# Patient Record
Sex: MALE | Race: WHITE | NOT HISPANIC OR LATINO | Employment: FULL TIME | URBAN - METROPOLITAN AREA
[De-identification: names, ages, dates, MRNs, and addresses within clinical notes are randomized per-mention and may not be internally consistent; named-entity substitution may affect disease eponyms.]

---

## 2018-01-25 ENCOUNTER — OFFICE VISIT (OUTPATIENT)
Dept: FAMILY MEDICINE | Facility: CLINIC | Age: 28
End: 2018-01-25
Payer: COMMERCIAL

## 2018-01-25 VITALS
SYSTOLIC BLOOD PRESSURE: 135 MMHG | HEART RATE: 73 BPM | TEMPERATURE: 99.2 F | WEIGHT: 179 LBS | HEIGHT: 71 IN | DIASTOLIC BLOOD PRESSURE: 77 MMHG | BODY MASS INDEX: 25.06 KG/M2

## 2018-01-25 DIAGNOSIS — F43.23 ADJUSTMENT DISORDER WITH MIXED ANXIETY AND DEPRESSED MOOD: Primary | ICD-10-CM

## 2018-01-25 PROCEDURE — 99203 OFFICE O/P NEW LOW 30 MIN: CPT | Performed by: FAMILY MEDICINE

## 2018-01-25 RX ORDER — ALUMINUM CHLORIDE 20 %
SOLUTION, NON-ORAL TOPICAL PRN
COMMUNITY
Start: 2017-07-03 | End: 2020-03-25

## 2018-01-25 RX ORDER — DULOXETIN HYDROCHLORIDE 30 MG/1
1 CAPSULE, DELAYED RELEASE ORAL DAILY
COMMUNITY
Start: 2018-01-08 | End: 2018-12-03

## 2018-01-25 RX ORDER — DULOXETIN HYDROCHLORIDE 60 MG/1
60 CAPSULE, DELAYED RELEASE ORAL DAILY
Qty: 30 CAPSULE | Refills: 1 | Status: SHIPPED | OUTPATIENT
Start: 2018-01-25 | End: 2018-12-03

## 2018-01-25 ASSESSMENT — PATIENT HEALTH QUESTIONNAIRE - PHQ9: 5. POOR APPETITE OR OVEREATING: NEARLY EVERY DAY

## 2018-01-25 ASSESSMENT — ANXIETY QUESTIONNAIRES
2. NOT BEING ABLE TO STOP OR CONTROL WORRYING: NEARLY EVERY DAY
5. BEING SO RESTLESS THAT IT IS HARD TO SIT STILL: NOT AT ALL
1. FEELING NERVOUS, ANXIOUS, OR ON EDGE: NEARLY EVERY DAY
IF YOU CHECKED OFF ANY PROBLEMS ON THIS QUESTIONNAIRE, HOW DIFFICULT HAVE THESE PROBLEMS MADE IT FOR YOU TO DO YOUR WORK, TAKE CARE OF THINGS AT HOME, OR GET ALONG WITH OTHER PEOPLE: EXTREMELY DIFFICULT
GAD7 TOTAL SCORE: 16
6. BECOMING EASILY ANNOYED OR IRRITABLE: SEVERAL DAYS
7. FEELING AFRAID AS IF SOMETHING AWFUL MIGHT HAPPEN: NEARLY EVERY DAY
3. WORRYING TOO MUCH ABOUT DIFFERENT THINGS: NEARLY EVERY DAY

## 2018-01-25 ASSESSMENT — PAIN SCALES - GENERAL: PAINLEVEL: NO PAIN (0)

## 2018-01-25 NOTE — PATIENT INSTRUCTIONS
[facility] - [phone]    Take 2 of your Cymbalta 30mg caps until they run out, and then  your new rx for 60mg caps.

## 2018-01-25 NOTE — MR AVS SNAPSHOT
After Visit Summary   1/25/2018    Timothy Thapa    MRN: [id number]           Patient Information     Date Of Birth          1990        Visit Information        Provider Department      1/25/2018 2:40 PM Engelmann, Lauren Anneliese, MD [facility]        Today's Diagnoses     Adjustment disorder with mixed anxiety and depressed mood    -  1      Care Instructions    [facility] - [phone]    Take 2 of your Cymbalta 30mg caps until they run out, and then  your new rx for 60mg caps.           Follow-ups after your visit        Additional Services     MENTAL HEALTH REFERRAL  - Adult; Outpatient Treatment; Individual/Couples/Family/Group Therapy/Health Psychology; [facility]: [facility] [phone]; We will contact you to schedule the appointment or please call with any questions       All scheduling is subject to the client's specific insurance plan & benefits, provider/location availability, and provider clinical specialities.  Please arrive 15 minutes early for your first appointment and bring your completed paperwork.    Please be aware that coverage of these services is subject to the terms and limitations of your health insurance plan.  Call member services at your health plan with any benefit or coverage questions.                            Your next 10 appointments already scheduled     Feb 12, 2018 11:20 AM CST   Office Visit with Lauren Anneliese Engelmann, MD   [facility]    [address] [place] [address] [phone]           Bring a current list of meds and any records pertaining to this visit. For Physicals, please bring immunization records and any forms needing to be filled out. Please arrive 10 minutes early to complete paperwork.              Who to contact     If you have questions or need follow up information about today's  "clinic visit or your schedule please contact [facility] directly at [phone].  Normal or non-critical lab and imaging results will be communicated to you by MyChart, letter or phone within 4 business days after the clinic has received the results. If you do not hear from us within 7 days, please contact the clinic through [employer] or phone. If you have a critical or abnormal lab result, we will notify you by phone as soon as possible.  Submit refill requests through [employer] or call your pharmacy and they will forward the refill request to us. Please allow 3 business days for your refill to be completed.          Additional Information About Your Visit        MyChart Information     [employer] lets you send messages to your doctor, view your test results, renew your prescriptions, schedule appointments and more. To sign up, go to [email or web address] . Click on \"Log in\" on the left side of the screen, which will take you to the Welcome page. Then click on \"Sign up Now\" on the right side of the page.     You will be asked to enter the access code listed below, as well as some personal information. Please follow the directions to create your username and password.     Your access code is: [email or web address]  Expires: 2018  3:40 PM     Your access code will  in 90 days. If you need help or a new code, please call your [place] clinic or [phone].        Care EveryWhere ID     This is your Care EveryWhere ID. This could be used by other organizations to access your [place] medical records  [id number]        Your Vitals Were     Pulse Temperature Height BMI (Body Mass Index)          73 99.2  F (37.3  C) (Oral) 5' 10.75\" (1.797 m) 25.14 kg/m2         Blood Pressure from Last 3 Encounters:   18 135/77    Weight from Last 3 Encounters:   18 179 lb (81.2 kg)              We Performed the Following     MENTAL HEALTH REFERRAL  - Adult; Outpatient Treatment; " Individual/Couples/Family/Group Therapy/Health Psychology; [facility]: [facility] [phone]; We will contact you to schedule the appointment or please call with any questions          Today's Medication Changes          These changes are accurate as of 1/25/18  3:40 PM.  If you have any questions, ask your nurse or doctor.               These medicines have changed or have updated prescriptions.        Dose/Directions    * DULoxetine 30 MG EC capsule   Commonly known as:  CYMBALTA   This may have changed:  Another medication with the same name was added. Make sure you understand how and when to take each.   Changed by:  Engelmann, Lauren Anneliese, MD        Dose:  1 capsule   Take 1 capsule by mouth daily   Refills:  0       * DULoxetine 60 MG EC capsule   Commonly known as:  CYMBALTA   This may have changed:  You were already taking a medication with the same name, and this prescription was added. Make sure you understand how and when to take each.   Used for:  Adjustment disorder with mixed anxiety and depressed mood   Changed by:  Engelmann, Lauren Anneliese, MD        Dose:  60 mg   Take 1 capsule (60 mg) by mouth daily   Quantity:  30 capsule   Refills:  1       * Notice:  This list has 2 medication(s) that are the same as other medications prescribed for you. Read the directions carefully, and ask your doctor or other care provider to review them with you.         Where to get your medicines      These medications were sent to [facility] [address]  [address], [facility] [address]     Phone:  [phone]     DULoxetine 60 MG EC capsule                Primary Care Provider Office Phone # Fax #    Lauren Anneliese Engelmann, [phone]       [facility] [address] [facility] [address]        Equal Access to Services     TYE BURDEN AH: Tarik Nieto  suzan barbour, william cecilmike salazar, altagracia stephenaashonda ah. So [facility] [phone].    ATENCIÓN: Si magda hall, tiene a neal disposición servicios gratuitos de asistencia lingüística. Maday al [phone].    We comply with applicable federal civil rights laws and [place] laws. We do not discriminate on the basis of race, color, national origin, age, disability, sex, sexual orientation, or gender identity.            Thank you!     Thank you for choosing [facility]  for your care. Our goal is always to provide you with excellent care. Hearing back from our patients is one way we can continue to improve our services. Please take a few minutes to complete the written survey that you may receive in the mail after your visit with us. Thank you!             Your Updated Medication List - Protect others around you: Learn how to safely use, store and throw away your medicines at www.disposemymeds.org.          This list is accurate as of 1/25/18  3:40 PM.  Always use your most recent med list.                   Brand Name Dispense Instructions for use Diagnosis    DRYSOL 20 % external solution   Generic drug:  aluminum chloride      as needed        * DULoxetine 30 MG EC capsule    CYMBALTA     Take 1 capsule by mouth daily        * DULoxetine 60 MG EC capsule    CYMBALTA    30 capsule    Take 1 capsule (60 mg) by mouth daily    Adjustment disorder with mixed anxiety and depressed mood       * Notice:  This list has 2 medication(s) that are the same as other medications prescribed for you. Read the directions carefully, and ask your doctor or other care provider to review them with you.

## 2018-01-25 NOTE — PROGRESS NOTES
SUBJECTIVE:   Timothy Thapa is a 27 year old male who presents to clinic today for the following health issues:    Abnormal Mood Symptoms  Onset: 11 years    Description:   Depression: YES  Anxiety: YES    Accompanying Signs & Symptoms:  Still participating in activities that you used to enjoy: YES- finds it difficult  Fatigue: YES  Irritability: no   Difficulty concentrating: no   Changes in appetite: YES  Problems with sleep: YES  Heart racing/beating fast : YES- during anxiety attacks  Thoughts of hurting yourself or others: none    History:   Recent stress: YES  Prior depression hospitalization: yes when teenager  Family history of depression: YES, mom    Family history of anxiety: YES, mom    Precipitating factors:   Alcohol/drug use: YES- occasional alcohol, marjuana    Alleviating factors:  Emotional support animal,breathing exercises    Therapies Tried and outcome: Buspar (Buspirone), Lexapro, Prozac with no help    New patient to the clinic today to discuss anxiety and panic. Feels like Cymbalta works a bit, but wondering if he can go up on the dose.   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 ,  is a great support to him.   No SI, no thoughts of self harm.    Problem list and histories reviewed & adjusted, as indicated.  Additional history: as documented    There is no problem list on file for this patient.    History reviewed. No pertinent surgical history.    Social History   Substance Use Topics     Smoking status: Never Smoker     Smokeless tobacco: Never Used     Alcohol use Yes      Comment: 3 Beers/wk     History reviewed. No pertinent family history.        Reviewed and updated as needed this visit by clinical staff       Reviewed and updated as needed this visit by Provider         ROS:  Constitutional, HEENT, cardiovascular, pulmonary, gi and gu systems are negative, except as otherwise noted.    OBJECTIVE:     /77 (BP Location: Right arm, Patient Position:  "Sitting, Cuff Size: Adult Regular)  Pulse 73  Temp 99.2  F (37.3  C) (Oral)  Ht 5' 10.75\" (1.797 m)  Wt 179 lb (81.2 kg)  BMI 25.14 kg/m2  Body mass index is 25.14 kg/(m^2).  GENERAL: healthy, alert and no distress  NECK: no adenopathy, no asymmetry, masses, or scars and thyroid normal to palpation  RESP: lungs clear to auscultation - no rales, rhonchi or wheezes  CV: regular rate and rhythm, normal S1 S2, no S3 or S4, no murmur, click or rub, no peripheral edema and peripheral pulses strong  ABDOMEN: soft, nontender, no hepatosplenomegaly, no masses and bowel sounds normal  MS: no gross musculoskeletal defects noted, no edema  PSYCH: mentation appears normal, affect normal/bright, judgement and insight intact, appearance well groomed and no SI, no thoughts of self harm, no A/V hallucinations.     Diagnostic Test Results:  none     ASSESSMENT/PLAN:       ICD-10-CM    1. Adjustment disorder with mixed anxiety and depressed mood F43.23 MENTAL HEALTH REFERRAL  - Adult; Outpatient Treatment; Individual/Couples/Family/Group Therapy/Health Psychology; [facility] [phone]; We will contact you to schedule the appointment or please call with any questions     DULoxetine (CYMBALTA) 60 MG EC capsule     Referral to mental health counseling.   Increased Cymbalta. Follow up in 2 weeks.    FUTURE APPOINTMENTS:       - Follow-up visit in 2 weeks       - Follow-up for annual visit or as needed  See Patient Instructions    Lauren A. Engelmann, MD  [facility]  "

## 2018-01-26 ASSESSMENT — ANXIETY QUESTIONNAIRES: GAD7 TOTAL SCORE: 16

## 2018-01-26 ASSESSMENT — PATIENT HEALTH QUESTIONNAIRE - PHQ9: SUM OF ALL RESPONSES TO PHQ QUESTIONS 1-9: 15

## 2018-02-12 ENCOUNTER — OFFICE VISIT (OUTPATIENT)
Dept: FAMILY MEDICINE | Facility: CLINIC | Age: 28
End: 2018-02-12
Payer: COMMERCIAL

## 2018-02-12 VITALS
HEART RATE: 71 BPM | WEIGHT: 177 LBS | TEMPERATURE: 97.9 F | BODY MASS INDEX: 24.86 KG/M2 | OXYGEN SATURATION: 99 % | DIASTOLIC BLOOD PRESSURE: 78 MMHG | SYSTOLIC BLOOD PRESSURE: 141 MMHG

## 2018-02-12 DIAGNOSIS — F33.41 MAJOR DEPRESSIVE DISORDER, RECURRENT EPISODE, IN PARTIAL REMISSION (H): Primary | ICD-10-CM

## 2018-02-12 DIAGNOSIS — F41.1 GAD (GENERALIZED ANXIETY DISORDER): ICD-10-CM

## 2018-02-12 PROCEDURE — 99213 OFFICE O/P EST LOW 20 MIN: CPT | Performed by: FAMILY MEDICINE

## 2018-02-12 ASSESSMENT — PAIN SCALES - GENERAL: PAINLEVEL: NO PAIN (0)

## 2018-02-12 ASSESSMENT — ANXIETY QUESTIONNAIRES
3. WORRYING TOO MUCH ABOUT DIFFERENT THINGS: MORE THAN HALF THE DAYS
5. BEING SO RESTLESS THAT IT IS HARD TO SIT STILL: NOT AT ALL
IF YOU CHECKED OFF ANY PROBLEMS ON THIS QUESTIONNAIRE, HOW DIFFICULT HAVE THESE PROBLEMS MADE IT FOR YOU TO DO YOUR WORK, TAKE CARE OF THINGS AT HOME, OR GET ALONG WITH OTHER PEOPLE: SOMEWHAT DIFFICULT
GAD7 TOTAL SCORE: 9
7. FEELING AFRAID AS IF SOMETHING AWFUL MIGHT HAPPEN: SEVERAL DAYS
1. FEELING NERVOUS, ANXIOUS, OR ON EDGE: MORE THAN HALF THE DAYS
6. BECOMING EASILY ANNOYED OR IRRITABLE: NOT AT ALL
2. NOT BEING ABLE TO STOP OR CONTROL WORRYING: NEARLY EVERY DAY

## 2018-02-12 ASSESSMENT — PATIENT HEALTH QUESTIONNAIRE - PHQ9: 5. POOR APPETITE OR OVEREATING: SEVERAL DAYS

## 2018-02-12 NOTE — PATIENT INSTRUCTIONS
Come back in 4-6 weeks for a physical.   Call for a mental health appointment before I see you again.

## 2018-02-12 NOTE — PROGRESS NOTES
SUBJECTIVE:   Timothy Thapa is a 27 year old male who presents to clinic today for the following health issues:    Depression and Anxiety Follow-Up    Status since last visit: Improved     Other associated symptoms:General anxiety feelings- stated that there is nothing new    Complicating factors:     Significant life event:  got work promotion- has changed structure but has been a positive thing     Current substance abuse: Cannabis    PHQ-9 1/25/2018 2/12/2018   Total Score 15 9   Q9: Suicide Ideation Not at all Not at all     NEETU-7 SCORE 1/25/2018 2/12/2018   Total Score 16 9       PHQ-9  English  PHQ-9   Any Language  NEETU-7  Suicide Assessment Five-step Evaluation and Treatment (SAFE-T)    Amount of exercise or physical activity: None    Problems taking medications regularly: - Sometimes forgetful    Medication side effects: none    Diet: regular (no restrictions)    Says that he's had a lot of good stress lately.  was promoted.  Enjoying things more. Feels like he is able to handle stress better.     Problem list and histories reviewed & adjusted, as indicated.  Additional history: as documented    Patient Active Problem List   Diagnosis     Major depressive disorder, recurrent episode, in partial remission (H)     NEETU (generalized anxiety disorder)     History reviewed. No pertinent surgical history.    Social History   Substance Use Topics     Smoking status: Never Smoker     Smokeless tobacco: Never Used     Alcohol use Yes      Comment: 3 Beers/wk     History reviewed. No pertinent family history.        Reviewed and updated as needed this visit by clinical staff  Tobacco  Allergies  Meds  Med Hx  Surg Hx  Fam Hx  Soc Hx      Reviewed and updated as needed this visit by Provider         ROS:  Constitutional, HEENT, cardiovascular, pulmonary, gi and gu systems are negative, except as otherwise noted.    OBJECTIVE:     /78 (BP Location: Left arm, Patient Position: Chair, Cuff  "Size: Adult Regular)  Pulse 71  Temp 97.9  F (36.6  C) (Oral)  Wt 177 lb (80.3 kg)  SpO2 99%  BMI 24.86 kg/m2  Body mass index is 24.86 kg/(m^2).  GENERAL: healthy, alert and no distress  NECK: no adenopathy, no asymmetry, masses, or scars and thyroid normal to palpation  RESP: lungs clear to auscultation - no rales, rhonchi or wheezes  CV: regular rate and rhythm, normal S1 S2, no S3 or S4, no murmur, click or rub, no peripheral edema and peripheral pulses strong  ABDOMEN: soft, nontender, no hepatosplenomegaly, no masses and bowel sounds normal  MS: no gross musculoskeletal defects noted, no edema  PSYCH: MENTAL STATUS EXAM  Appearance: appropriate, well-groomed   Attitude: cooperative, engaged in conversation   Behavior: normal, no psychomotor agitation or retardation   Eye Contact: normal  Speech: normal ashley, normal volume   Orientation: oriented to person, place, time and situation  Mood: \"improved\"  Affect: tearful, mood-congruent   Thought Process: linear  Suicidal Ideation: No passive or active SI or plan, no thoughts of self-harm, reports children as protective factor   Hallucination: no A or V hallucinations      Diagnostic Test Results:  none     ASSESSMENT/PLAN:       ICD-10-CM    1. Major depressive disorder, recurrent episode, in partial remission (H) F33.41    2. NEETU (generalized anxiety disorder) F41.1      Improving.   Advised patient to schedule psychotherapy visit.   Continue current meds.     FUTURE APPOINTMENTS:       - Follow-up visit in 4-6 weeks       - Follow-up for annual visit or as needed  See Patient Instructions    Lauren A. Engelmann, MD  [facility]    "

## 2018-02-12 NOTE — PROGRESS NOTES
"  SUBJECTIVE:   Timothy Thapa is a 27 year old male who presents to clinic today for the following health issues:    Anxiety Follow-Up    Status since last visit: { :316882::\"No change\"}    Other associated symptoms:{ :014212::\"None\"}    Complicating factors:   Significant life event: { :478881::\"No\"}   Current substance abuse: { :813402::\"None\"}  Depression symptoms: { :002100::\"No\"}  NEETU-7 SCORE 1/25/2018   Total Score 16       NEETU-7    Amount of exercise or physical activity: {Exercise frequency days per week:969985}    Problems taking medications regularly: {Med Problems:144903::\"No\"}    Medication side effects: {CHRONIC MED SIDE EFFECTS:848236::\"none\"}    Diet: { :527105}        {additional problems for provider to add:696744}    Problem list and histories reviewed & adjusted, as indicated.  Additional history: {NONE - AS DOCUMENTED:803121::\"as documented\"}    {HIST REVIEW/ LINKS 2:815076}    Reviewed and updated as needed this visit by clinical staff       Reviewed and updated as needed this visit by Provider         {PROVIDER CHARTING PREFERENCE:334305}    "

## 2018-02-12 NOTE — MR AVS SNAPSHOT
"              After Visit Summary   2/12/2018    Timothy Thapa    MRN: [id number]           Patient Information     Date Of Birth          1990        Visit Information        Provider Department      2/12/2018 11:20 AM Engelmann, Lauren Anneliese, MD [facility]        Care Instructions    Come back in 4-6 weeks for a physical.   Call for a mental health appointment before I see you again.          Follow-ups after your visit        Your next 10 appointments already scheduled     Mar 12, 2018 11:20 AM CDT   SHORT with Lauren Anneliese Engelmann, MD   [facility] ([facility])    [address] [place] [address]   [phone]              Who to contact     If you have questions or need follow up information about today's clinic visit or your schedule please contact [facility] directly at [phone].  Normal or non-critical lab and imaging results will be communicated to you by MyChart, letter or phone within 4 business days after the clinic has received the results. If you do not hear from us within 7 days, please contact the clinic through MyChart or phone. If you have a critical or abnormal lab result, we will notify you by phone as soon as possible.  Submit refill requests through [employer] or call your pharmacy and they will forward the refill request to us. Please allow 3 business days for your refill to be completed.          Additional Information About Your Visit        MyChart Information     [employer] lets you send messages to your doctor, view your test results, renew your prescriptions, schedule appointments and more. To sign up, go to [email or web address] . Click on \"Log in\" on the left side of the screen, which will take you to the Welcome page. Then click on \"Sign up Now\" on the right side of the page.     You will be asked to enter the access code listed below, as well as " some personal information. Please follow the directions to create your username and password.     Your access code is: [email or web address]  Expires: 2018  3:40 PM     Your access code will  in 90 days. If you need help or a new code, please call your [place] clinic or [phone].        Care EveryWhere ID     This is your Care EveryWhere ID. This could be used by other organizations to access your [place] medical records  [id number]        Your Vitals Were     Pulse Temperature Pulse Oximetry BMI (Body Mass Index)          71 97.9  F (36.6  C) (Oral) 99% 24.86 kg/m2         Blood Pressure from Last 3 Encounters:   18 141/78   18 135/77    Weight from Last 3 Encounters:   18 177 lb (80.3 kg)   18 179 lb (81.2 kg)              Today, you had the following     No orders found for display       Primary Care Provider Office Phone # Fax #    Lisandra Anneliese Engelmann, [phone]       [facility] [address] [facility] [address]        Equal Access to Services     NICOLETTE BURDEN : Hadii aad ku hadasho Soomaali, waaxda luqadaha, qaybta kaalmada adeegyada, waxay idiin hayaan adeeg carolinaararachel latatin ah. So [facility] [phone].    ATENCIÓN: Si habla español, tiene a neal disposición servicios gratuitos de asistencia lingüística. Llame al [phone].    We comply with applicable federal civil rights laws and [place] laws. We do not discriminate on the basis of race, color, national origin, age, disability, sex, sexual orientation, or gender identity.            Thank you!     Thank you for choosing [facility]  for your care. Our goal is always to provide you with excellent care. Hearing back from our patients is one way we can continue to improve our services. Please take a few minutes to complete the written survey that you may receive in the mail after your visit with us. Thank you!             Your Updated Medication  List - Protect others around you: Learn how to safely use, store and throw away your medicines at www.disposemymeds.org.          This list is accurate as of 2/12/18 11:51 AM.  Always use your most recent med list.                   Brand Name Dispense Instructions for use Diagnosis    DRYSOL 20 % external solution   Generic drug:  aluminum chloride      as needed        * DULoxetine 30 MG EC capsule    CYMBALTA     Take 1 capsule by mouth daily        * DULoxetine 60 MG EC capsule    CYMBALTA    30 capsule    Take 1 capsule (60 mg) by mouth daily    Adjustment disorder with mixed anxiety and depressed mood       * Notice:  This list has 2 medication(s) that are the same as other medications prescribed for you. Read the directions carefully, and ask your doctor or other care provider to review them with you.

## 2018-02-12 NOTE — NURSING NOTE
"Chief Complaint   Patient presents with     Depression     Anxiety       Initial /78 (BP Location: Left arm, Patient Position: Chair, Cuff Size: Adult Regular)  Pulse 71  Temp 97.9  F (36.6  C) (Oral)  Wt 177 lb (80.3 kg)  SpO2 99%  BMI 24.86 kg/m2 Estimated body mass index is 24.86 kg/(m^2) as calculated from the following:    Height as of 1/25/18: 5' 10.75\" (1.797 m).    Weight as of this encounter: 177 lb (80.3 kg).  Medication Reconciliation: complete  Ora Johnson MA    "

## 2018-02-13 ASSESSMENT — PATIENT HEALTH QUESTIONNAIRE - PHQ9: SUM OF ALL RESPONSES TO PHQ QUESTIONS 1-9: 9

## 2018-02-13 ASSESSMENT — ANXIETY QUESTIONNAIRES: GAD7 TOTAL SCORE: 9

## 2018-02-27 PROBLEM — F41.1 GAD (GENERALIZED ANXIETY DISORDER): Status: ACTIVE | Noted: 2018-02-27

## 2018-02-27 PROBLEM — F33.41 MAJOR DEPRESSIVE DISORDER, RECURRENT EPISODE, IN PARTIAL REMISSION (H): Status: ACTIVE | Noted: 2018-02-27

## 2018-11-27 ENCOUNTER — TELEPHONE (OUTPATIENT)
Dept: FAMILY MEDICINE | Facility: CLINIC | Age: 28
End: 2018-11-27

## 2018-11-27 NOTE — TELEPHONE ENCOUNTER
Patient scheduled an appointment with PCP on 12/03 via [employer] for the following:    Exposure to Giardia and looking to restart anxiety medications.     Routing to review symptoms prior to appointment.

## 2018-11-27 NOTE — TELEPHONE ENCOUNTER
Called patient at [phone] (home). Left message on voicemail to return phone call to triage line at [phone].  Beth Sharp RN [facility] Triage.

## 2018-11-28 NOTE — TELEPHONE ENCOUNTER
Called patient at [phone] (home) Left message on voicemail to return phone call to triage line at [phone].  Beth Sharp RN [facility] Triage.

## 2018-11-30 NOTE — TELEPHONE ENCOUNTER
Called patient..  Left message on voicemail to return phone call to triage line at [phone].  Beth Sharp RN [facility] Triage.

## 2018-12-03 ENCOUNTER — OFFICE VISIT (OUTPATIENT)
Dept: FAMILY MEDICINE | Facility: CLINIC | Age: 28
End: 2018-12-03
Payer: COMMERCIAL

## 2018-12-03 VITALS
SYSTOLIC BLOOD PRESSURE: 126 MMHG | WEIGHT: 188 LBS | BODY MASS INDEX: 26.41 KG/M2 | TEMPERATURE: 98 F | DIASTOLIC BLOOD PRESSURE: 80 MMHG | HEART RATE: 71 BPM | OXYGEN SATURATION: 97 %

## 2018-12-03 DIAGNOSIS — F33.41 MAJOR DEPRESSIVE DISORDER, RECURRENT EPISODE, IN PARTIAL REMISSION (H): Primary | ICD-10-CM

## 2018-12-03 DIAGNOSIS — F41.1 GAD (GENERALIZED ANXIETY DISORDER): ICD-10-CM

## 2018-12-03 DIAGNOSIS — R11.0 NAUSEA: ICD-10-CM

## 2018-12-03 DIAGNOSIS — F40.232 PHOBIA OF DENTAL PROCEDURE: ICD-10-CM

## 2018-12-03 PROCEDURE — 99214 OFFICE O/P EST MOD 30 MIN: CPT | Performed by: FAMILY MEDICINE

## 2018-12-03 RX ORDER — LORAZEPAM 1 MG/1
0.5-1 TABLET ORAL EVERY 8 HOURS PRN
Qty: 8 TABLET | Refills: 0 | Status: SHIPPED | OUTPATIENT
Start: 2018-12-03 | End: 2020-06-30

## 2018-12-03 RX ORDER — DULOXETIN HYDROCHLORIDE 30 MG/1
CAPSULE, DELAYED RELEASE ORAL
Qty: 50 CAPSULE | Refills: 0 | Status: SHIPPED | OUTPATIENT
Start: 2018-12-03 | End: 2019-01-07

## 2018-12-03 RX ORDER — ONDANSETRON 4 MG/1
4 TABLET, ORALLY DISINTEGRATING ORAL EVERY 6 HOURS PRN
Qty: 30 TABLET | Refills: 1 | Status: SHIPPED | OUTPATIENT
Start: 2018-12-03 | End: 2020-06-30

## 2018-12-03 ASSESSMENT — PATIENT HEALTH QUESTIONNAIRE - PHQ9
5. POOR APPETITE OR OVEREATING: MORE THAN HALF THE DAYS
SUM OF ALL RESPONSES TO PHQ QUESTIONS 1-9: 11

## 2018-12-03 ASSESSMENT — PAIN SCALES - GENERAL: PAINLEVEL: NO PAIN (0)

## 2018-12-03 ASSESSMENT — ANXIETY QUESTIONNAIRES
1. FEELING NERVOUS, ANXIOUS, OR ON EDGE: NEARLY EVERY DAY
5. BEING SO RESTLESS THAT IT IS HARD TO SIT STILL: SEVERAL DAYS
GAD7 TOTAL SCORE: 16
6. BECOMING EASILY ANNOYED OR IRRITABLE: SEVERAL DAYS
3. WORRYING TOO MUCH ABOUT DIFFERENT THINGS: NEARLY EVERY DAY
IF YOU CHECKED OFF ANY PROBLEMS ON THIS QUESTIONNAIRE, HOW DIFFICULT HAVE THESE PROBLEMS MADE IT FOR YOU TO DO YOUR WORK, TAKE CARE OF THINGS AT HOME, OR GET ALONG WITH OTHER PEOPLE: EXTREMELY DIFFICULT
7. FEELING AFRAID AS IF SOMETHING AWFUL MIGHT HAPPEN: NEARLY EVERY DAY
2. NOT BEING ABLE TO STOP OR CONTROL WORRYING: NEARLY EVERY DAY

## 2018-12-03 NOTE — PROGRESS NOTES
SUBJECTIVE:   Timothy Thapa is a 28 year old male who presents to clinic today for the following health issues:    Depression and Anxiety Follow-Up    Status since last visit: No change    Other associated symptoms:Frequent anxiety and panic attacks, general depression    Complicating factors: stopped medication     Significant life event: yes, upcoming job change     Current substance abuse: None    PHQ 1/25/2018 2/12/2018 12/3/2018   PHQ-9 Total Score 15 9 11   Q9: Suicide Ideation Not at all Not at all Not at all     NEETU-7 SCORE 1/25/2018 2/12/2018 12/3/2018   Total Score 16 9 16     In the past two weeks have you had thoughts of suicide or self-harm?  No.    Do you have concerns about your personal safety or the safety of others?   No  PHQ-9  English  PHQ-9   Any Language  NEETU-7  Suicide Assessment Five-step Evaluation and Treatment (SAFE-T)     Amount of exercise or physical activity: None     Problems taking medications regularly: No    Medication side effects: Sour stomach when taking medications, consistency with meds    Diet: regular (no restrictions)    Also has upcoming dental work, gets very anxious, wonders if he can have anything to help him stay calm     Amenable to restarting Cymbalta, got slightly nauseated when he first started it - requesting Zofran. He isn't really sure why he stopped in the first place. Hasn't taken it in months.     Work is stressful but getting better. Ownership of his place of employment is changing, this is good.     Partner remains an excellent support system.     Problem list and histories reviewed & adjusted, as indicated.  Additional history: as documented    Patient Active Problem List   Diagnosis     Major depressive disorder, recurrent episode, in partial remission (H)     NEETU (generalized anxiety disorder)     History reviewed. No pertinent surgical history.    Social History     Tobacco Use     Smoking status: Never Smoker     Smokeless tobacco: Never Used    Substance Use Topics     Alcohol use: Yes     Comment: 3 Beers/wk     History reviewed. No pertinent family history.        Reviewed and updated as needed this visit by clinical staff  Tobacco  Allergies  Meds  Med Hx  Surg Hx  Fam Hx  Soc Hx      Reviewed and updated as needed this visit by Provider         ROS:  Constitutional, HEENT, cardiovascular, pulmonary, gi and gu systems are negative, except as otherwise noted.    OBJECTIVE:     /80 (BP Location: Left arm, Patient Position: Chair, Cuff Size: Adult Regular)   Pulse 71   Temp 98  F (36.7  C) (Oral)   Wt 85.3 kg (188 lb)   SpO2 97%   BMI 26.41 kg/m    Body mass index is 26.41 kg/m .  GENERAL: healthy, alert, no distress and over weight  RESP: lungs clear to auscultation - no rales, rhonchi or wheezes  CV: regular rate and rhythm, normal S1 S2, no S3 or S4, no murmur, click or rub, no peripheral edema and peripheral pulses strong  ABDOMEN: soft, nontender, no hepatosplenomegaly, no masses and bowel sounds normal  PSYCH: mentation appears normal, affect normal/bright, judgement and insight intact and appearance well groomed    Diagnostic Test Results:  none     ASSESSMENT/PLAN:       ICD-10-CM    1. Major depressive disorder, recurrent episode, in partial remission (H) F33.41 DULoxetine (CYMBALTA) 30 MG capsule   2. NEETU (generalized anxiety disorder) F41.1 DULoxetine (CYMBALTA) 30 MG capsule   3. Phobia of dental procedure F40.232 LORazepam (ATIVAN) 1 MG tablet   4. Nausea R11.0 ondansetron (ZOFRAN ODT) 4 MG ODT tab     Restart cymbalta, discussed importance of taking daily.   Ok for lorazepam for dental procedures.   ED and crisis resources reviewed    zofran PRN for nausea associated with cymbalta initiation.     Patient Safety Assessment:    After gathering the above information, Timothy presents the following high risk factors for suicide: male.  Timothy denies current fears or concerns for personal safety.    Timothy has the following  Protective Factors: Sense of responsibility to family, Life Satisfaction, Positive coping skills and Positive social support     Upon review of the patient interview, identification of the above factors, safety strategy alternatives, and treatment plan interventions: medication start     Regular exercise  See Patient Instructions    Lauren A. Engelmann, MD  [facility]

## 2018-12-03 NOTE — MR AVS SNAPSHOT
After Visit Summary   12/3/2018    Timothy Thapa    MRN: [id number]           Patient Information     Date Of Birth          1990        Visit Information        Provider Department      12/3/2018 11:00 AM Engelmann, Lauren Anneliese, MD [facility]        Today's Diagnoses     Major depressive disorder, recurrent episode, in partial remission (H)    -  1    NEETU (generalized anxiety disorder)        Phobia of dental procedure        Nausea           Follow-ups after your visit        Follow-up notes from your care team     Return in about 4 weeks (around 12/31/2018) for Mood Followup.      Your next 10 appointments already scheduled     Jan 07, 2019 11:00 AM CST   SHORT with Lauren Anneliese Engelmann, MD   [facility] ([facility])    [address] [place] [id number] [phone]              Who to contact     If you have questions or need follow up information about today's clinic visit or your schedule please contact [facility] directly at [phone].  Normal or non-critical lab and imaging results will be communicated to you by MyChart, letter or phone within 4 business days after the clinic has received the results. If you do not hear from us within 7 days, please contact the clinic through MyChart or phone. If you have a critical or abnormal lab result, we will notify you by phone as soon as possible.  Submit refill requests through [employer] or call your pharmacy and they will forward the refill request to us. Please allow 3 business days for your refill to be completed.          Additional Information About Your Visit        Care EveryWhere ID     This is your Care EveryWhere ID. This could be used by other organizations to access your [facility] medical records  [id number]        Your Vitals Were     Pulse Temperature Pulse Oximetry BMI (Body Mass Index)          71 98   F (36.7  C) (Oral) 97% 26.41 kg/m2         Blood Pressure from Last 3 Encounters:   12/03/18 126/80   02/12/18 141/78   01/25/18 135/77    Weight from Last 3 Encounters:   12/03/18 188 lb (85.3 kg)   02/12/18 177 lb (80.3 kg)   01/25/18 179 lb (81.2 kg)              Today, you had the following     No orders found for display         Today's Medication Changes          These changes are accurate as of 12/3/18 11:40 AM.  If you have any questions, ask your nurse or doctor.               Start taking these medicines.        Dose/Directions    LORazepam 1 MG tablet   Commonly known as:  ATIVAN   Used for:  Phobia of dental procedure   Started by:  Engelmann, Lauren Anneliese, MD        Dose:  0.5-1 mg   Take 0.5-1 tablets (0.5-1 mg) by mouth every 8 hours as needed for anxiety Take 30 minutes prior to dental work.  Do not operate a vehicle after taking this medication   Quantity:  8 tablet   Refills:  0       ondansetron 4 MG ODT tab   Commonly known as:  ZOFRAN ODT   Used for:  Nausea   Started by:  Engelmann, Lauren Anneliese, MD        Dose:  4 mg   Take 1 tablet (4 mg) by mouth every 6 hours as needed for nausea   Quantity:  30 tablet   Refills:  1         These medicines have changed or have updated prescriptions.        Dose/Directions    DULoxetine 30 MG capsule   Commonly known as:  CYMBALTA   This may have changed:    - how much to take  - how to take this  - when to take this  - additional instructions   Used for:  Major depressive disorder, recurrent episode, in partial remission (H), NEETU (generalized anxiety disorder)   Changed by:  Engelmann, Lauren Anneliese, MD        For 10 days, take 30mg per day, then increase to 60mg daily.   Quantity:  50 capsule   Refills:  0            Where to get your medicines      These medications were sent to [facility] - [place], [place] - [address]  [address], [facility] [address]     Phone:  [phone]      DULoxetine 30 MG capsule    ondansetron 4 MG ODT tab         Some of these will need a paper prescription and others can be bought over the counter.  Ask your nurse if you have questions.     Bring a paper prescription for each of these medications     LORazepam 1 MG tablet                Primary Care Provider Office Phone # Fax #    Lisandra Anneliese Engelmann, [phone]       [facility] [address] [facility] [address]        Equal Access to Services     TYE BURDEN : Hadii aad ku hadasho Soomaali, waaxda luqadaha, qaybta kaalmada adeegyada, waxay idiin hayaan adeeg kharash la'tosin . [facility] [phone].    ATENCIÓN: Si habla español, tiene a neal disposición servicios gratuitos de asistencia lingüística. [facility] [phone].    We comply with applicable federal civil rights laws and [place] laws. We do not discriminate on the basis of race, color, national origin, age, disability, sex, sexual orientation, or gender identity.            Thank you!     Thank you for choosing [facility]  for your care. Our goal is always to provide you with excellent care. Hearing back from our patients is one way we can continue to improve our services. Please take a few minutes to complete the written survey that you may receive in the mail after your visit with us. Thank you!             Your Updated Medication List - Protect others around you: Learn how to safely use, store and throw away your medicines at www.disposemymeds.org.          This list is accurate as of 12/3/18 11:40 AM.  Always use your most recent med list.                   Brand Name Dispense Instructions for use Diagnosis    DRYSOL 20 % external solution   Generic drug:  aluminum chloride      as needed        DULoxetine 30 MG capsule    CYMBALTA    50 capsule    For 10 days, take 30mg per day, then increase to 60mg daily.    Major depressive disorder, recurrent episode, in partial  remission (H), NEETU (generalized anxiety disorder)       LORazepam 1 MG tablet    ATIVAN    8 tablet    Take 0.5-1 tablets (0.5-1 mg) by mouth every 8 hours as needed for anxiety Take 30 minutes prior to dental work.  Do not operate a vehicle after taking this medication    Phobia of dental procedure       ondansetron 4 MG ODT tab    ZOFRAN ODT    30 tablet    Take 1 tablet (4 mg) by mouth every 6 hours as needed for nausea    Nausea

## 2018-12-04 ASSESSMENT — ANXIETY QUESTIONNAIRES: GAD7 TOTAL SCORE: 16

## 2019-01-07 ENCOUNTER — OFFICE VISIT (OUTPATIENT)
Dept: FAMILY MEDICINE | Facility: CLINIC | Age: 29
End: 2019-01-07
Payer: COMMERCIAL

## 2019-01-07 DIAGNOSIS — F41.1 GAD (GENERALIZED ANXIETY DISORDER): Primary | ICD-10-CM

## 2019-01-07 PROCEDURE — 99441 ZZC PHYSICIAN TELEPHONE EVALUATION 5-10 MIN: CPT | Performed by: FAMILY MEDICINE

## 2019-01-07 RX ORDER — DULOXETIN HYDROCHLORIDE 60 MG/1
60 CAPSULE, DELAYED RELEASE ORAL DAILY
Qty: 90 CAPSULE | Refills: 1 | Status: SHIPPED | OUTPATIENT
Start: 2019-01-07 | End: 2019-08-23

## 2019-01-07 ASSESSMENT — ANXIETY QUESTIONNAIRES
IF YOU CHECKED OFF ANY PROBLEMS ON THIS QUESTIONNAIRE, HOW DIFFICULT HAVE THESE PROBLEMS MADE IT FOR YOU TO DO YOUR WORK, TAKE CARE OF THINGS AT HOME, OR GET ALONG WITH OTHER PEOPLE: SOMEWHAT DIFFICULT
5. BEING SO RESTLESS THAT IT IS HARD TO SIT STILL: SEVERAL DAYS
6. BECOMING EASILY ANNOYED OR IRRITABLE: NOT AT ALL
1. FEELING NERVOUS, ANXIOUS, OR ON EDGE: NEARLY EVERY DAY
3. WORRYING TOO MUCH ABOUT DIFFERENT THINGS: MORE THAN HALF THE DAYS
2. NOT BEING ABLE TO STOP OR CONTROL WORRYING: MORE THAN HALF THE DAYS
GAD7 TOTAL SCORE: 10
7. FEELING AFRAID AS IF SOMETHING AWFUL MIGHT HAPPEN: SEVERAL DAYS

## 2019-01-07 ASSESSMENT — PATIENT HEALTH QUESTIONNAIRE - PHQ9
SUM OF ALL RESPONSES TO PHQ QUESTIONS 1-9: 10
5. POOR APPETITE OR OVEREATING: SEVERAL DAYS

## 2019-01-07 NOTE — PROGRESS NOTES
"Timothy Thapa is a 28 year old male who is being evaluated via a telephone visit.      The patient has been notified of following:     \"This telephone visit will be conducted via a call between you and your physician/provider. We have found that certain health care needs can be provided without the need for a physical exam.  This service lets us provide the care you need with a short phone conversation.  If a prescription is necessary we can send it directly to your pharmacy.  If lab work is needed we can place an order for that and you can then stop by our lab to have the test done at a later time.    We will bill your insurance company for this service.  Please check with your medical insurance if this type of visit is covered. You may be responsible for the cost of this type of visit if insurance coverage is denied.  The typical cost is $30 (10min), $59(11-20min) and $85 (21-30min).  Most often these visits are shorter than 10 minutes.    If during the course of the call the physician/provider feels a telephone visit is not appropriate, you will not be charged for this service. \"     Consent has been obtained for this service by 1 care team member:yes. See the scanned image in the medical record.    Preferred patient phone number to be used for this call: [phone]     Timothy Thapa complains of  Anxiety and Depression follow up.    No past medical history on file.   Social History     Socioeconomic History     Marital status:      Spouse name: Not on file     Number of children: Not on file     Years of education: Not on file     Highest education level: Not on file   Social Needs     Financial resource strain: Not on file     Food insecurity - worry: Not on file     Food insecurity - inability: Not on file     Transportation needs - medical: Not on file     Transportation needs - non-medical: Not on file   Occupational History     Not on file   Tobacco Use     Smoking status: Never Smoker     " Smokeless tobacco: Never Used   Substance and Sexual Activity     Alcohol use: Yes     Comment: 3 Beers/wk     Drug use: Yes     Types: Marijuana     Sexual activity: Yes     Partners: Male   Other Topics Concern     Parent/sibling w/ CABG, MI or angioplasty before 65F 55M? Not Asked   Social History Narrative     Not on file     ALLERGIES  Amoxicillin and Penicillins    Additional provider notes: Patient doing very well. He is taking meds every day. Mild nausea but that has resolved. Feels like general day to day anxiety is better, still having some situational anxiety but this is manageable. Ok with maintaining on 60mg Cymbalta per day.     Assessment/Plan:  Timothy was seen today for telephone encounter dictation.    Diagnoses and all orders for this visit:    NEETU (generalized anxiety disorder)  -     DULoxetine (CYMBALTA) 60 MG capsule; Take 1 capsule (60 mg) by mouth daily          Total time spent on this phone visit with the patient = 6 minutes     I have reviewed the note as documented above.  This accurately captures the substance of my conversation with the patient.     Lauren Engelmann, MD

## 2019-01-09 ASSESSMENT — ANXIETY QUESTIONNAIRES: GAD7 TOTAL SCORE: 10

## 2019-08-23 ENCOUNTER — MYC REFILL (OUTPATIENT)
Dept: FAMILY MEDICINE | Facility: CLINIC | Age: 29
End: 2019-08-23

## 2019-08-23 DIAGNOSIS — F41.1 GAD (GENERALIZED ANXIETY DISORDER): ICD-10-CM

## 2019-08-26 DIAGNOSIS — F41.1 GAD (GENERALIZED ANXIETY DISORDER): ICD-10-CM

## 2019-08-26 RX ORDER — DULOXETIN HYDROCHLORIDE 60 MG/1
60 CAPSULE, DELAYED RELEASE ORAL DAILY
Qty: 90 CAPSULE | Refills: 1 | Status: SHIPPED | OUTPATIENT
Start: 2019-08-26 | End: 2020-04-02

## 2019-08-26 NOTE — TELEPHONE ENCOUNTER
"Requested Prescriptions   Pending Prescriptions Disp Refills     DULoxetine (CYMBALTA) 60 MG capsule [Pharmacy Med Name: DULOXETINE HCL 60MG CPEP] 90 capsule 1     Sig: TAKE ONE CAPSULE BY MOUTH ONCE DAILY   Last Written Prescription Date:  1-7-19  Last Fill Quantity: 90,  # refills: 1   Last office visit: 1/7/2019 with prescribing provider:  1-7-19   Future Office Visit:   Next 5 appointments (look out 90 days)    Sep 09, 2019  8:00 AM CDT  PHYSICAL with SABRINA Guevara CNP  [facility] [address] [place] [id number]  [phone]             Serotonin-Norepinephrine Reuptake Inhibitors  Passed - 8/26/2019 11:20 AM        Passed - Blood pressure under 140/90 in past 12 months     BP Readings from Last 3 Encounters:   12/03/18 126/80   02/12/18 141/78   01/25/18 135/77                 Passed - Recent (12 mo) or future (30 days) visit within the authorizing provider's specialty     Patient had office visit in the last 12 months or has a visit in the next 30 days with authorizing provider or within the authorizing provider's specialty.  See \"Patient Info\" tab in inbasket, or \"Choose Columns\" in Meds & Orders section of the refill encounter.              Passed - Medication is active on med list        Passed - Patient is age 18 or older          "

## 2019-08-26 NOTE — TELEPHONE ENCOUNTER
"Requested Prescriptions   Pending Prescriptions Disp Refills     DULoxetine (CYMBALTA) 60 MG capsule 90 capsule 1     Sig: Take 1 capsule (60 mg) by mouth daily       Serotonin-Norepinephrine Reuptake Inhibitors  Passed - 8/23/2019  8:30 AM        Passed - Blood pressure under 140/90 in past 12 months     BP Readings from Last 3 Encounters:   12/03/18 126/80   02/12/18 141/78   01/25/18 135/77                 Passed - Recent (12 mo) or future (30 days) visit within the authorizing provider's specialty     Patient had office visit in the last 12 months or has a visit in the next 30 days with authorizing provider or within the authorizing provider's specialty.  See \"Patient Info\" tab in inbasket, or \"Choose Columns\" in Meds & Orders section of the refill encounter.              Passed - Medication is active on med list        Passed - Patient is age 18 or older        Last refill 1/7/19 # 90 with one refill.  Last OV 1/7/19    Routing refill request to provider for review/approval because:  Medication is still under Dr. Mulligan.  Beth Sharp, RN [facility] Triage.              "

## 2019-08-27 RX ORDER — DULOXETIN HYDROCHLORIDE 60 MG/1
CAPSULE, DELAYED RELEASE ORAL
Qty: 90 CAPSULE | Refills: 1 | Status: SHIPPED | OUTPATIENT
Start: 2019-08-27 | End: 2020-06-30

## 2019-08-27 NOTE — TELEPHONE ENCOUNTER
Prescription approved per [facility] Refill Protocol.  Santy Bhatt RN

## 2020-03-11 ENCOUNTER — HEALTH MAINTENANCE LETTER (OUTPATIENT)
Age: 30
End: 2020-03-11

## 2020-03-25 ENCOUNTER — MYC MEDICAL ADVICE (OUTPATIENT)
Dept: FAMILY MEDICINE | Facility: CLINIC | Age: 30
End: 2020-03-25

## 2020-03-25 DIAGNOSIS — R61 HYPERHIDROSIS: Primary | ICD-10-CM

## 2020-03-25 RX ORDER — ALUMINUM CHLORIDE 20 %
SOLUTION, NON-ORAL TOPICAL
Qty: 35 ML | Refills: 0 | Status: SHIPPED | OUTPATIENT
Start: 2020-03-25 | End: 2020-06-30

## 2020-03-25 NOTE — TELEPHONE ENCOUNTER
Please see [employer] message.     Patient last seen by Dr. Mulligan - need telephone visit?     Routed to PCP to review and advise.     Radha Lopez, RN, BSN, PHN  [facility]: [place]

## 2020-04-02 ENCOUNTER — MYC REFILL (OUTPATIENT)
Dept: FAMILY MEDICINE | Facility: CLINIC | Age: 30
End: 2020-04-02

## 2020-04-02 DIAGNOSIS — F41.1 GAD (GENERALIZED ANXIETY DISORDER): ICD-10-CM

## 2020-04-02 RX ORDER — DULOXETIN HYDROCHLORIDE 60 MG/1
60 CAPSULE, DELAYED RELEASE ORAL DAILY
Qty: 90 CAPSULE | Refills: 1 | Status: SHIPPED | OUTPATIENT
Start: 2020-04-02 | End: 2020-10-26

## 2020-06-24 ENCOUNTER — MYC MEDICAL ADVICE (OUTPATIENT)
Dept: FAMILY MEDICINE | Facility: CLINIC | Age: 30
End: 2020-06-24

## 2020-06-30 ENCOUNTER — VIRTUAL VISIT (OUTPATIENT)
Dept: FAMILY MEDICINE | Facility: CLINIC | Age: 30
End: 2020-06-30
Payer: COMMERCIAL

## 2020-06-30 DIAGNOSIS — F41.1 GENERALIZED ANXIETY DISORDER: Primary | ICD-10-CM

## 2020-06-30 PROCEDURE — 99213 OFFICE O/P EST LOW 20 MIN: CPT | Mod: TEL | Performed by: INTERNAL MEDICINE

## 2020-06-30 RX ORDER — BUSPIRONE HYDROCHLORIDE 10 MG/1
10 TABLET ORAL 3 TIMES DAILY PRN
Qty: 90 TABLET | Refills: 3 | Status: SHIPPED | OUTPATIENT
Start: 2020-06-30 | End: 2020-10-26

## 2020-06-30 ASSESSMENT — ANXIETY QUESTIONNAIRES
5. BEING SO RESTLESS THAT IT IS HARD TO SIT STILL: NOT AT ALL
1. FEELING NERVOUS, ANXIOUS, OR ON EDGE: NEARLY EVERY DAY
3. WORRYING TOO MUCH ABOUT DIFFERENT THINGS: NEARLY EVERY DAY
IF YOU CHECKED OFF ANY PROBLEMS ON THIS QUESTIONNAIRE, HOW DIFFICULT HAVE THESE PROBLEMS MADE IT FOR YOU TO DO YOUR WORK, TAKE CARE OF THINGS AT HOME, OR GET ALONG WITH OTHER PEOPLE: VERY DIFFICULT
7. FEELING AFRAID AS IF SOMETHING AWFUL MIGHT HAPPEN: MORE THAN HALF THE DAYS
6. BECOMING EASILY ANNOYED OR IRRITABLE: SEVERAL DAYS
GAD7 TOTAL SCORE: 15
2. NOT BEING ABLE TO STOP OR CONTROL WORRYING: NEARLY EVERY DAY

## 2020-06-30 ASSESSMENT — PATIENT HEALTH QUESTIONNAIRE - PHQ9
SUM OF ALL RESPONSES TO PHQ QUESTIONS 1-9: 8
5. POOR APPETITE OR OVEREATING: NEARLY EVERY DAY

## 2020-06-30 NOTE — PROGRESS NOTES
"Timothy Thapa is a 30 year old male who is being evaluated via a billable telephone visit.      The patient has been notified of following:     \"This telephone visit will be conducted via a call between you and your physician/provider. We have found that certain health care needs can be provided without the need for a physical exam.  This service lets us provide the care you need with a short phone conversation.  If a prescription is necessary we can send it directly to your pharmacy.  If lab work is needed we can place an order for that and you can then stop by our lab to have the test done at a later time.    Telephone visits are billed at different rates depending on your insurance coverage. During this emergency period, for some insurers they may be billed the same as an in-person visit.  Please reach out to your insurance provider with any questions.    If during the course of the call the physician/provider feels a telephone visit is not appropriate, you will not be charged for this service.\"    Patient has given verbal consent for Telephone visit?  Yes    What phone number would you like to be contacted at? [phone]    How would you like to obtain your AVS? Blake Gant     Timothy Thapa is a 30 year old male who presents via phone visit today for the following health issues:    HPI  Depression and Anxiety Follow-Up    How are you doing with your depression since your last visit? Worsened change in life plan    How are you doing with your anxiety since your last visit?  Worsened change in life plan    Are you having other symptoms that might be associated with depression or anxiety? No    Have you had a significant life event? OTHER: not going to college, but working with people and causing some more anxiety     Do you have any concerns with your use of alcohol or other drugs? No    Social History     Tobacco Use     Smoking status: Never Smoker     Smokeless tobacco: Never Used   Substance " Use Topics     Alcohol use: Yes     Comment: 3 Beers/wk     Drug use: Yes     Types: Marijuana     PHQ 2/12/2018 12/3/2018 1/7/2019   PHQ-9 Total Score 9 11 10   Q9: Thoughts of better off dead/self-harm past 2 weeks Not at all Not at all Not at all     NEETU-7 SCORE 2/12/2018 12/3/2018 1/7/2019   Total Score 9 16 10     Last PHQ-9 6/30/2020   1.  Little interest or pleasure in doing things 1   2.  Feeling down, depressed, or hopeless 2   3.  Trouble falling or staying asleep, or sleeping too much 1   4.  Feeling tired or having little energy 1   5.  Poor appetite or overeating 0   6.  Feeling bad about yourself 3   7.  Trouble concentrating 0   8.  Moving slowly or restless 0   Q9: Thoughts of better off dead/self-harm past 2 weeks 0   PHQ-9 Total Score 8   Difficulty at work, home, or with people Very difficult     NEETU-7  6/30/2020   1. Feeling nervous, anxious, or on edge 3   2. Not being able to stop or control worrying 3   3. Worrying too much about different things 3   4. Trouble relaxing 3   5. Being so restless that it is hard to sit still 0   6. Becoming easily annoyed or irritable 1   7. Feeling afraid, as if something awful might happen 2   NEETU-7 Total Score 15   If you checked any problems, how difficult have they made it for you to do your work, take care of things at home, or get along with other people? Very difficult     In the past two weeks have you had thoughts of suicide or self-harm?  No.    Do you have concerns about your personal safety or the safety of others?   No    Suicide Assessment Five-step Evaluation and Treatment (SAFE-T)      How many servings of fruits and vegetables do you eat daily?  2-3    On average, how many sweetened beverages do you drink each day (Examples: soda, juice, sweet tea, etc.  Do NOT count diet or artificially sweetened beverages)?   0    How many days per week do you exercise enough to make your heart beat faster? 3 or less    How many minutes a day do you exercise  enough to make your heart beat faster?     How many days per week do you miss taking your medication? 0     side effect low libido      Patient Active Problem List   Diagnosis     Major depressive disorder, recurrent episode, in partial remission (H)     NEETU (generalized anxiety disorder)     No past surgical history on file.    Social History     Tobacco Use     Smoking status: Never Smoker     Smokeless tobacco: Never Used   Substance Use Topics     Alcohol use: Yes     Comment: 3 Beers/wk     No family history on file.      Current Outpatient Medications   Medication Sig Dispense Refill     busPIRone (BUSPAR) 10 MG tablet Take 1 tablet (10 mg) by mouth 3 times daily as needed (anxiety) 90 tablet 3     DULoxetine (CYMBALTA) 60 MG capsule Take 1 capsule (60 mg) by mouth daily 90 capsule 1     Allergies   Allergen Reactions     Amoxicillin Hives     Penicillins      No lab results found.   BP Readings from Last 3 Encounters:   12/03/18 126/80   02/12/18 141/78   01/25/18 135/77    Wt Readings from Last 3 Encounters:   12/03/18 85.3 kg (188 lb)   02/12/18 80.3 kg (177 lb)   01/25/18 81.2 kg (179 lb)                    Reviewed and updated as needed this visit by Provider         Review of Systems   Constitutional, HEENT, cardiovascular, pulmonary, gi and gu systems are negative, except as otherwise noted.       Objective   Reported vitals:  There were no vitals taken for this visit.   healthy, alert and no distress  PSYCH: Alert and oriented times 3; coherent speech, normal   rate and volume, able to articulate logical thoughts, able   to abstract reason, no tangential thoughts, no hallucinations   or delusions  His affect is normal  RESP: No cough, no audible wheezing, able to talk in full sentences  Remainder of exam unable to be completed due to telephone visits    Diagnostic Test Results:  Labs reviewed in Epic  No results found for any visits on 06/30/20.        Assessment/Plan:  1. Generalized anxiety  disorder    - busPIRone (BUSPAR) 10 MG tablet; Take 1 tablet (10 mg) by mouth 3 times daily as needed (anxiety)  Dispense: 90 tablet; Refill: 3      Continue cymbalta at 60 mg dose   Counseling   Exercise   Healthy diet   Sleep of 8 -9 hours per day    No follow-ups on file.      Phone call duration:  23 minutes    Hugo Davis MD

## 2020-07-01 ASSESSMENT — ANXIETY QUESTIONNAIRES: GAD7 TOTAL SCORE: 15

## 2020-10-26 ENCOUNTER — MYC REFILL (OUTPATIENT)
Dept: FAMILY MEDICINE | Facility: CLINIC | Age: 30
End: 2020-10-26

## 2020-10-26 DIAGNOSIS — F41.1 GENERALIZED ANXIETY DISORDER: ICD-10-CM

## 2020-10-26 DIAGNOSIS — F41.1 GAD (GENERALIZED ANXIETY DISORDER): ICD-10-CM

## 2020-10-26 RX ORDER — DULOXETIN HYDROCHLORIDE 60 MG/1
60 CAPSULE, DELAYED RELEASE ORAL DAILY
Qty: 90 CAPSULE | Refills: 1 | Status: SHIPPED | OUTPATIENT
Start: 2020-10-26 | End: 2021-03-10 | Stop reason: ALTCHOICE

## 2020-10-26 NOTE — TELEPHONE ENCOUNTER
"Requested Prescriptions   Pending Prescriptions Disp Refills    DULoxetine (CYMBALTA) 60 MG capsule 90 capsule 1     Sig: Take 1 capsule (60 mg) by mouth daily       Serotonin-Norepinephrine Reuptake Inhibitors  Failed - 10/26/2020 10:32 AM        Failed - Blood pressure under 140/90 in past 12 months     BP Readings from Last 3 Encounters:   12/03/18 126/80   02/12/18 141/78   01/25/18 135/77                 Passed - Recent (12 mo) or future (30 days) visit within the authorizing provider's specialty     Patient has had an office visit with the authorizing provider or a provider within the authorizing providers department within the previous 12 mos or has a future within next 30 days. See \"Patient Info\" tab in inbasket, or \"Choose Columns\" in Meds & Orders section of the refill encounter.              Passed - Medication is active on med list        Passed - Patient is age 18 or older           Routing refill request to provider for review/approval because:  Failed protocol.  Beth COOPERN-RN  [facility]    "

## 2020-10-29 RX ORDER — BUSPIRONE HYDROCHLORIDE 10 MG/1
10 TABLET ORAL 3 TIMES DAILY PRN
Qty: 90 TABLET | Refills: 0 | Status: SHIPPED | OUTPATIENT
Start: 2020-10-29 | End: 2021-01-06

## 2020-10-29 NOTE — TELEPHONE ENCOUNTER
"Requested Prescriptions   Pending Prescriptions Disp Refills     busPIRone (BUSPAR) 10 MG tablet 90 tablet 3     Sig: Take 1 tablet (10 mg) by mouth 3 times daily as needed (anxiety)       Atypical Antidepressants Protocol Passed - 10/26/2020 10:32 AM        Passed - Recent (12 mo) or future (30 days) visit within the authorizing provider's specialty     Patient has had an office visit with the authorizing provider or a provider within the authorizing providers department within the previous 12 mos or has a future within next 30 days. See \"Patient Info\" tab in inbasket, or \"Choose Columns\" in Meds & Orders section of the refill encounter.              Passed - Medication active on med list        Passed - Patient is age 18 or older           Prescription approved per [facility] Refill Protocol.  ALLISON GloverN-RN  [facility]    "

## 2021-01-03 ENCOUNTER — HEALTH MAINTENANCE LETTER (OUTPATIENT)
Age: 31
End: 2021-01-03

## 2021-01-06 ENCOUNTER — VIRTUAL VISIT (OUTPATIENT)
Dept: FAMILY MEDICINE | Facility: CLINIC | Age: 31
End: 2021-01-06
Payer: COMMERCIAL

## 2021-01-06 DIAGNOSIS — F33.41 MAJOR DEPRESSIVE DISORDER, RECURRENT EPISODE, IN PARTIAL REMISSION (H): Primary | ICD-10-CM

## 2021-01-06 PROCEDURE — 99214 OFFICE O/P EST MOD 30 MIN: CPT | Mod: TEL | Performed by: INTERNAL MEDICINE

## 2021-01-06 PROCEDURE — 96127 BRIEF EMOTIONAL/BEHAV ASSMT: CPT | Performed by: INTERNAL MEDICINE

## 2021-01-06 RX ORDER — ESCITALOPRAM OXALATE 20 MG/1
10-20 TABLET ORAL DAILY
Qty: 90 TABLET | Refills: 3 | Status: SHIPPED | OUTPATIENT
Start: 2021-01-06 | End: 2022-02-09

## 2021-01-06 ASSESSMENT — PATIENT HEALTH QUESTIONNAIRE - PHQ9: SUM OF ALL RESPONSES TO PHQ QUESTIONS 1-9: 16

## 2021-01-06 NOTE — PATIENT INSTRUCTIONS
Week 1:  Cymbalta 60 mg every other day for 7 days  Week 2:  Cymbalta, 1/2 lexapro, nothing, Cymbalta, 1/2 lexparo, nothing 6 days  Week 3:  1/2 lexapro every day   Week 4: 1/2  lexapro every days  Week 5 : 1 full lexapro daily

## 2021-01-06 NOTE — PROGRESS NOTES
Timothy Thapa is a 30 year old male who is being evaluated via a billable telephone visit.      What phone number would you like to be contacted at? [phone]  How would you like to obtain your AVS? MyChart  Assessment & Plan   Problem List Items Addressed This Visit     Major depressive disorder, recurrent episode, in partial remission (H) - Primary    Relevant Medications    escitalopram (LEXAPRO) 20 MG tablet         Reviewed safety plan and suicide risk    I've explained to him that drugs of the SSRI class can have side effects such as weight gain, sexual dysfunction, insomnia, headache, nausea. These medications are generally effective at alleviating symptoms of anxiety and/or depression. Let me know if significant side effects do occur.                             Depression Screening Follow Up    PHQ 1/6/2021   PHQ-9 Total Score 16   Q9: Thoughts of better off dead/self-harm past 2 weeks More than half the days     Last PHQ-9 1/6/2021   1.  Little interest or pleasure in doing things 3   2.  Feeling down, depressed, or hopeless 3   3.  Trouble falling or staying asleep, or sleeping too much 2   4.  Feeling tired or having little energy 2   5.  Poor appetite or overeating 1   6.  Feeling bad about yourself 3   7.  Trouble concentrating 0   8.  Moving slowly or restless 0   Q9: Thoughts of better off dead/self-harm past 2 weeks 2   PHQ-9 Total Score 16   Difficulty at work, home, or with people Extremely dIfficult         No flowsheet data found.      Follow Up      Follow Up Actions Taken  Crisis resource information provided in the After Visit Summary  Mental Health Referral placed    Discussed the following ways the patient can remain in a safe environment:  be around others    Work on weight loss  Regular exercise    Return in about 2 months (around 3/6/2021).    Hugo Davis MD  [facility]    Subjective     Timothy Thapa is a 30 year old who presents to clinic today for  the following health issues     HPI     Depression Followup    How are you doing with your depression since your last visit? No change    Are you having other symptoms that might be associated with depression? No    Have you had a significant life event?  No     Are you feeling anxious or having panic attacks?   No    Do you have any concerns with your use of alcohol or other drugs? No    Social History     Tobacco Use     Smoking status: Never Smoker     Smokeless tobacco: Never Used   Substance Use Topics     Alcohol use: Yes     Comment: 3 Beers/wk     Drug use: Yes     Types: Marijuana     PHQ 1/7/2019 6/30/2020 1/6/2021   PHQ-9 Total Score 10 8 16   Q9: Thoughts of better off dead/self-harm past 2 weeks Not at all Not at all More than half the days     NEETU-7 SCORE 12/3/2018 1/7/2019 6/30/2020   Total Score 16 10 15     Last PHQ-9 1/6/2021   1.  Little interest or pleasure in doing things 3   2.  Feeling down, depressed, or hopeless 3   3.  Trouble falling or staying asleep, or sleeping too much 2   4.  Feeling tired or having little energy 2   5.  Poor appetite or overeating 1   6.  Feeling bad about yourself 3   7.  Trouble concentrating 0   8.  Moving slowly or restless 0   Q9: Thoughts of better off dead/self-harm past 2 weeks 2   PHQ-9 Total Score 16   Difficulty at work, home, or with people Extremely dIfficult       buspar helpful at first and then the comfort level with school and medication evened out a bit and hard to remember     Taking the cymbalta   Hard to determine at the moment when they developed into the current   Suicidal thoughts having during this time   mother used to place in the hospital if having a fight   In the past and feeling young 18 - 23 medications ( bounced ) zoloft     Messing with stomach and nausea   Anti-nausea medication while starting - to help   Irrational set of fears.  Instead of consistent feeling   Obsessive thought in the back of the mind  Therapist .  Early  Decemember  Before having a break downandneeds to talk to people   Some tanked the sex drive    In the past two weeks have you had thoughts of suicide or self-harm?  Yes  In the past two weeks have you thought of a plan or intent to harm yourself? No.  Do you have concerns about your personal safety or the safety of others?   Yes     Suicide Assessment Five-step Evaluation and Treatment (SAFE-T)      How many servings of fruits and vegetables do you eat daily?  0-1    On average, how many sweetened beverages do you drink each day (Examples: soda, juice, sweet tea, etc.  Do NOT count diet or artificially sweetened beverages)?   0    How many days per week do you exercise enough to make your heart beat faster? 3 or less    How many minutes a day do you exercise enough to make your heart beat faster? 30 - 60    How many days per week do you miss taking your medication? 0        Review of Systems   Constitutional, HEENT, cardiovascular, pulmonary, gi and gu systems are negative, except as otherwise noted.      Objective           Vitals:  No vitals were obtained today due to virtual visit.    Physical Exam   healthy, alert and no distress  PSYCH: Alert and oriented times 3; coherent speech, normal   rate and volume, able to articulate logical thoughts, able   to abstract reason, no tangential thoughts, no hallucinations   or delusions  His affect is normal  RESP: No cough, no audible wheezing, able to talk in full sentences  Remainder of exam unable to be completed due to telephone visits    No results found for any visits on 01/06/21.            Phone call duration: 20 minutes

## 2021-03-10 ENCOUNTER — VIRTUAL VISIT (OUTPATIENT)
Dept: FAMILY MEDICINE | Facility: CLINIC | Age: 31
End: 2021-03-10
Payer: COMMERCIAL

## 2021-03-10 DIAGNOSIS — K08.89 PAIN, DENTAL: Primary | ICD-10-CM

## 2021-03-10 PROCEDURE — 99213 OFFICE O/P EST LOW 20 MIN: CPT | Mod: 95 | Performed by: PHYSICIAN ASSISTANT

## 2021-03-10 NOTE — PROGRESS NOTES
Ibeth is a 30 year old who is being evaluated via a billable video visit.      How would you like to obtain your AVS? MyChart  If the video visit is dropped, the invitation should be resent by: Text to cell phone: [phone]   Will anyone else be joining your video visit? No      Video Start Time: 2:22 PM    Assessment & Plan     Pain, dental  Discussed risk of using narcotics.   not concerning, ok to use short term.  encouraged to use as little as possible.  Keep dental appointment.  If he develops a fever or drainage will need to be seen.    - acetaminophen-codeine (TYLENOL #3) 300-30 MG tablet; Take 1 tablet by mouth every 6 hours as needed for severe pain                 No follow-ups on file.    Michelle Berger PA-C  [facility]    Alfreda Cristina is a 30 year old who presents for the following health issues     HPI       Patient is requesting medication for dental pain.Taking Ibuprofen and tylenol -no relief. Dental appointment scheduled for 3-25-21   Has a dental phobia.  Had not been to dentist for a long time.  Just restarted going again in the last 2 years.  Needs two teeth removed.  Was to get these a while ago but due to the pandemic didn't go.  Also grinds teeth.  Has pain on left side.  Pain started in the middle of the night last night and constant.  Doesn't look infected, maybe a little swelling.  No fever.    No hx of CD.          Review of Systems   As above      Objective           Vitals:  No vitals were obtained today due to virtual visit.    Physical Exam   GENERAL: Healthy, alert and no distress  EYES: Eyes grossly normal to inspection.  No discharge or erythema, or obvious scleral/conjunctival abnormalities.  RESP: No audible wheeze, cough, or visible cyanosis.  No visible retractions or increased work of breathing.    SKIN: Visible skin clear. No significant rash, abnormal pigmentation or lesions.  NEURO: Cranial nerves grossly intact.  Mentation and  speech appropriate for age.  PSYCH: Mentation appears normal, affect normal/bright, judgement and insight intact, normal speech and appearance well-groomed.                Video-Visit Details    Type of service:  Video Visit    Video End Time:2:32 PM    Originating Location (pt. Location): Home    Distant Location (provider location):  [facility]     Platform used for Video Visit: [employer]

## 2021-04-25 ENCOUNTER — HEALTH MAINTENANCE LETTER (OUTPATIENT)
Age: 31
End: 2021-04-25

## 2021-09-18 ENCOUNTER — ANCILLARY PROCEDURE (OUTPATIENT)
Dept: GENERAL RADIOLOGY | Facility: CLINIC | Age: 31
End: 2021-09-18
Attending: FAMILY MEDICINE
Payer: COMMERCIAL

## 2021-09-18 ENCOUNTER — OFFICE VISIT (OUTPATIENT)
Dept: URGENT CARE | Facility: URGENT CARE | Age: 31
End: 2021-09-18
Payer: COMMERCIAL

## 2021-09-18 VITALS
SYSTOLIC BLOOD PRESSURE: 120 MMHG | DIASTOLIC BLOOD PRESSURE: 73 MMHG | OXYGEN SATURATION: 95 % | TEMPERATURE: 98.9 F | HEART RATE: 73 BPM

## 2021-09-18 DIAGNOSIS — S99.911A INJURY OF RIGHT ANKLE, INITIAL ENCOUNTER: ICD-10-CM

## 2021-09-18 DIAGNOSIS — S82.891A CLOSED FRACTURE OF RIGHT ANKLE, INITIAL ENCOUNTER: Primary | ICD-10-CM

## 2021-09-18 PROCEDURE — 73610 X-RAY EXAM OF ANKLE: CPT | Mod: RT | Performed by: RADIOLOGY

## 2021-09-18 PROCEDURE — 29515 APPLICATION SHORT LEG SPLINT: CPT | Performed by: FAMILY MEDICINE

## 2021-09-18 PROCEDURE — 99213 OFFICE O/P EST LOW 20 MIN: CPT | Mod: 25 | Performed by: FAMILY MEDICINE

## 2021-09-18 NOTE — PROGRESS NOTES
Assessment & Plan     Closed fracture of right ankle, initial encounter  X-ray findings reviewed independently, consistent with right distal fibular fracture.  Treatment options discussed.  Posterior leg splint applied with Ortho-Glass in usual fashion, crutches provided for mobility.  Suggested rest, leg elevation, over-the-counter analgesia.  Orthopedic referral placed for further review and recommendations.  Patient,  understood and in agreement with above plan.  All questions answered.  - XR Ankle Right G/E 3 Views; Future  - Orthopedic  Referral; Future      Patient Instructions     Patient Education     Ankle Fracture    You have an ankle fracture. This means that one or more of the bones that make up the ankle joint are broken. This often causes pain, swelling, and bruising.   A fracture is treated with a splint, cast, or special boot. It will take about 4 to 6 weeks for the fracture to heal. Surgery may be needed to fix severe injuries.   Home care    You will be given a splint, cast, or boot to prevent movement at the ankle joint. Unless you were told otherwise, use crutches or a walker. Don t put weight on the injured leg until cleared by your healthcare provider to do so. Crutches and walkers can be rented at many pharmacies and surgical or orthopedic supply stores. Don t put weight on a splint. It will break.    Keep your leg raised to reduce pain and swelling. When sleeping, place a pillow under the injured leg. When sitting, support the injured leg so it is often. This is very important during the first 48 hours.    Apply an ice pack over the injured area for no more than 15 to 20 minutes. Do this every 3 to 6 hours for the first 24 to 48 hours. Keep using ice packs 3 to 4 times a day for the next 2 to 3 days, then as needed to ease pain and swelling. To make an ice pack, put ice cubes in a plastic bag that seals at the top. Wrap the bag in a clean, thin towel or cloth. Never put ice  or an ice pack directly on the skin. You can place the ice pack directly over the cast or splint. As the ice melts, be careful that the cast or splint doesn t get wet.    Keep the cast, splint, or boot completely dry at all times. Bathe with your cast, splint, or boot out of the water, protected with 2 large plastic bags. Place 1 bag outside of the other. Tape each bag with duct tape at the top end or use rubber bands. Water can still leak in. So it's best to keep the cast, splint, or boot away from water. If a boot or fiberglass cast or splint gets wet, dry it with a hair dryer on a cool setting.    You may use over-the-counter pain medicine to control pain, unless another pain medicine was prescribed. Talk with your provider before using these medicines if you have chronic liver or kidney disease or ever had a stomach ulcer or GI (gastrointestinal) bleeding.    Follow-up care  Follow up with your healthcare provider in 1 week, or as advised. This is to be sure the bone is healing correctly. If you were given a splint, it may be changed to a cast or boot at your follow-up visit.   If X-rays were taken, you will be told of any new findings that may affect your care.  When to seek medical advice  Call your healthcare provider right away if any of these occur:    The plaster cast or splint becomes wet or soft    The fiberglass cast or splint stays wet for more than 24 hours    There is increased tightness, sore areas, or pain under the cast or splint    Your toes become swollen, cold, blue, numb, or tingly    The cast or splint becomes loose    The cast or splint has a bad smell    The cast or splint develops cracks or breaks   Inaaya last reviewed this educational content on 4/1/2018 2000-2021 [employer]. All rights reserved. This information is not intended as a substitute for professional medical care. Always follow your healthcare professional's instructions.                 Mason Bravo,  MD [facility] Cristina is a 31 year old who presents for the following health issues     HPI     Musculoskeletal problem/pain  Onset/Duration: Yesterday  Description  Location: Right ankle   Joint Swelling: YES  Redness: no  Pain: YES  Warmth: no  Intensity:  moderate  Progression of Symptoms:  same  Accompanying signs and symptoms:   Fevers: no  Numbness/tingling/weakness: no  History  Trauma to the area: last night walking down stairs, right ankle gave out, swollen and unable to put weight on foot.   Recent illness:  no  Previous similar problem: no  Previous evaluation:  no  Therapies tried and outcome: rest/inactivity and NSAID - advil      Review of Systems   Constitutional, HEENT, cardiovascular, pulmonary, gi and gu systems are negative, except as otherwise noted.      Objective    /73   Pulse 73   Temp 98.9  F (37.2  C) (Tympanic)   SpO2 95%   There is no height or weight on file to calculate BMI.  Physical Exam   GENERAL: alert, no distress and obese  MS: Right ankle moderately swollen and tender on palpation, no skin discoloration, warmth noted, Jimenes Homans' sign negative, pedal pulses 3+, sensation to touch and pressure intact  SKIN: no suspicious lesions or rashes  NEURO: Normal strength and tone, mentation intact and speech normal  PSYCH: mentation appears normal, affect normal/bright

## 2021-09-18 NOTE — PATIENT INSTRUCTIONS
Patient Education     Ankle Fracture    You have an ankle fracture. This means that one or more of the bones that make up the ankle joint are broken. This often causes pain, swelling, and bruising.   A fracture is treated with a splint, cast, or special boot. It will take about 4 to 6 weeks for the fracture to heal. Surgery may be needed to fix severe injuries.   Home care    You will be given a splint, cast, or boot to prevent movement at the ankle joint. Unless you were told otherwise, use crutches or a walker. Don t put weight on the injured leg until cleared by your healthcare provider to do so. Crutches and walkers can be rented at many pharmacies and surgical or orthopedic supply stores. Don t put weight on a splint. It will break.    Keep your leg raised to reduce pain and swelling. When sleeping, place a pillow under the injured leg. When sitting, support the injured leg so it is often. This is very important during the first 48 hours.    Apply an ice pack over the injured area for no more than 15 to 20 minutes. Do this every 3 to 6 hours for the first 24 to 48 hours. Keep using ice packs 3 to 4 times a day for the next 2 to 3 days, then as needed to ease pain and swelling. To make an ice pack, put ice cubes in a plastic bag that seals at the top. Wrap the bag in a clean, thin towel or cloth. Never put ice or an ice pack directly on the skin. You can place the ice pack directly over the cast or splint. As the ice melts, be careful that the cast or splint doesn t get wet.    Keep the cast, splint, or boot completely dry at all times. Bathe with your cast, splint, or boot out of the water, protected with 2 large plastic bags. Place 1 bag outside of the other. Tape each bag with duct tape at the top end or use rubber bands. Water can still leak in. So it's best to keep the cast, splint, or boot away from water. If a boot or fiberglass cast or splint gets wet, dry it with a hair dryer on a cool setting.    You  may use over-the-counter pain medicine to control pain, unless another pain medicine was prescribed. Talk with your provider before using these medicines if you have chronic liver or kidney disease or ever had a stomach ulcer or GI (gastrointestinal) bleeding.    Follow-up care  Follow up with your healthcare provider in 1 week, or as advised. This is to be sure the bone is healing correctly. If you were given a splint, it may be changed to a cast or boot at your follow-up visit.   If X-rays were taken, you will be told of any new findings that may affect your care.  When to seek medical advice  Call your healthcare provider right away if any of these occur:    The plaster cast or splint becomes wet or soft    The fiberglass cast or splint stays wet for more than 24 hours    There is increased tightness, sore areas, or pain under the cast or splint    Your toes become swollen, cold, blue, numb, or tingly    The cast or splint becomes loose    The cast or splint has a bad smell    The cast or splint develops cracks or breaks   Rupesh last reviewed this educational content on 4/1/2018 2000-2021 [employer]. All rights reserved. This information is not intended as a substitute for professional medical care. Always follow your healthcare professional's instructions.

## 2021-09-29 ENCOUNTER — OFFICE VISIT (OUTPATIENT)
Dept: PODIATRY | Facility: CLINIC | Age: 31
End: 2021-09-29
Attending: FAMILY MEDICINE
Payer: COMMERCIAL

## 2021-09-29 VITALS — HEIGHT: 72 IN | HEART RATE: 88 BPM | WEIGHT: 210 LBS | OXYGEN SATURATION: 98 % | BODY MASS INDEX: 28.44 KG/M2

## 2021-09-29 DIAGNOSIS — S99.911A INJURY OF RIGHT ANKLE, INITIAL ENCOUNTER: ICD-10-CM

## 2021-09-29 DIAGNOSIS — S82.891D CLOSED FRACTURE OF RIGHT ANKLE WITH ROUTINE HEALING, SUBSEQUENT ENCOUNTER: Primary | ICD-10-CM

## 2021-09-29 PROCEDURE — 99203 OFFICE O/P NEW LOW 30 MIN: CPT | Mod: 57 | Performed by: PODIATRIST

## 2021-09-29 PROCEDURE — 27780 TREATMENT OF FIBULA FRACTURE: CPT | Mod: RT | Performed by: PODIATRIST

## 2021-09-29 ASSESSMENT — MIFFLIN-ST. JEOR: SCORE: 1955.55

## 2021-09-29 ASSESSMENT — PAIN SCALES - GENERAL: PAINLEVEL: MILD PAIN (3)

## 2021-09-29 NOTE — LETTER
9/29/2021         RE: Timothy Thapa  [address] [address]        Dear Colleague,    Thank you for referring your patient, Timothy Thapa, to the [facility]. Please see a copy of my visit note below.    FOOT AND ANKLE SURGERY/PODIATRY CONSULT NOTE         ASSESSMENT:   Nondisplaced spiral oblique fracture right fibula    TREATMENT:  I recommend the patient remain total nonweightbearing in a cam boot next 5 weeks..  The patient is to return to the clinic in 5 weeks for follow-up x-ray.        HPI: I was asked to see Timothy Thapa today to evaluate and treat a fracture of the right ankle.  The patient indicated that he injured his right ankle in September 17 of this year.  The patient was seen by primary care physician.  An x-ray was taken of the right ankle.  The x-ray indicated fracture of the fibula.  The patient has been nonweightbearing since the time of the injury.  He is currently in a posterior splint.  He stated that the swelling has improved.  His pain is well managed.  He has no other complaints.  The patient was seen in consultation at the request of Mason Bravo MD for for evaluation and treatment of right ankle fracture..     Past Medical History:   Diagnosis Date     Depressive disorder        Social History     Socioeconomic History     Marital status:      Spouse name: Not on file     Number of children: Not on file     Years of education: Not on file     Highest education level: Not on file   Occupational History     Not on file   Tobacco Use     Smoking status: Never Smoker     Smokeless tobacco: Never Used   Substance and Sexual Activity     Alcohol use: Yes     Comment: 3 Beers/wk     Drug use: Yes     Types: Marijuana     Sexual activity: Yes     Partners: Male   Other Topics Concern     Parent/sibling w/ CABG, MI or angioplasty before 65F 55M? No   Social History Narrative     Not on file     Social Determinants of Health      Financial Resource Strain:      Difficulty of Paying Living Expenses:    Food Insecurity:      Worried About Running Out of Food in the Last Year:      Ran Out of Food in the Last Year:    Transportation Needs:      Lack of Transportation (Medical):      Lack of Transportation (Non-Medical):    Physical Activity:      Days of Exercise per Week:      Minutes of Exercise per Session:    Stress:      Feeling of Stress :    Social Connections:      Frequency of Communication with Friends and Family:      Frequency of Social Gatherings with Friends and Family:      Attends Protestant Services:      Active Member of Clubs or Organizations:      Attends Club or Organization Meetings:      Marital Status:    Intimate Partner Violence:      Fear of Current or Ex-Partner:      Emotionally Abused:      Physically Abused:      Sexually Abused:           Allergies   Allergen Reactions     Amoxicillin Hives     Pcn [Penicillins]           Current Outpatient Medications:      escitalopram (LEXAPRO) 20 MG tablet, Take 0.5-1 tablets (10-20 mg) by mouth daily, Disp: 90 tablet, Rfl: 3     Family History   Problem Relation Age of Onset     Other Cancer Mother         Pancreatic Cancer     Depression Mother      Anxiety Disorder Mother      Mental Illness Mother         Social History     Socioeconomic History     Marital status:      Spouse name: Not on file     Number of children: Not on file     Years of education: Not on file     Highest education level: Not on file   Occupational History     Not on file   Tobacco Use     Smoking status: Never Smoker     Smokeless tobacco: Never Used   Substance and Sexual Activity     Alcohol use: Yes     Comment: 3 Beers/wk     Drug use: Yes     Types: Marijuana     Sexual activity: Yes     Partners: Male   Other Topics Concern     Parent/sibling w/ CABG, MI or angioplasty before 65F 55M? No   Social History Narrative     Not on file     Social Determinants of Health     Financial  Resource Strain:      Difficulty of Paying Living Expenses:    Food Insecurity:      Worried About Running Out of Food in the Last Year:      Ran Out of Food in the Last Year:    Transportation Needs:      Lack of Transportation (Medical):      Lack of Transportation (Non-Medical):    Physical Activity:      Days of Exercise per Week:      Minutes of Exercise per Session:    Stress:      Feeling of Stress :    Social Connections:      Frequency of Communication with Friends and Family:      Frequency of Social Gatherings with Friends and Family:      Attends Taoism Services:      Active Member of Clubs or Organizations:      Attends Club or Organization Meetings:      Marital Status:    Intimate Partner Violence:      Fear of Current or Ex-Partner:      Emotionally Abused:      Physically Abused:      Sexually Abused:         Review of Systems - Patient denies fever, chills, rash, wound, stiffness, limping, numbness, weakness, heart burn, blood in stool, chest pain with activity, calf pain when walking, shortness of breath with activity, chronic cough, easy bleeding/bruising, swelling of ankles, excessive thirst, fatigue, depression, anxiety.  Patient admits to right ankle fracture.      OBJECTIVE:  Appearance: alert, well appearing, and in no distress.    Pulse 88   Ht 1.829 m (6')   Wt 95.3 kg (210 lb)   SpO2 98%   BMI 28.48 kg/m       Body mass index is 28.48 kg/m .     General appearance: Patient is alert and fully cooperative with history & exam.  No sign of distress is noted during the visit.  Psychiatric: Affect is pleasant & appropriate.  Patient appears motivated to improve health.  Respiratory: Breathing is regular & unlabored while sitting.  HEENT: Hearing is intact to spoken word.  Speech is clear.  No gross evidence of visual impairment that would impact ambulation.    Vascular: Dorsalis pedis and posterior tibial pulses are palpable. There is pedal hair growth bilaterally.  CFT < 3 sec from  anterior tibial surface to distal digits bilaterally. There is no appreciable edema noted.  Dermatologic: Turgor and texture are within normal limits. No coloration or temperature changes. No primary or secondary lesions noted.  Neurologic: All epicritic and proprioceptive sensations are grossly intact bilaterally.  Musculoskeletal: All active and passive ankle, subtalar, midtarsal, and 1st MPJ range of motion are grossly intact without pain or crepitus, with the exception of the right ankle. Manual muscle strength is within normal limits bilaterally. All dorsiflexors, plantarflexors, invertors, evertors are intact bilaterally. Tenderness present to lateral aspect of the right ankle on palpation. Tenderness to the lateral aspect of the right ankle with range of motion. Calf is soft/non-tender without warmth/induration    Imaging:       No images are attached to the encounter or orders placed in the encounter.     XR Ankle Right G/E 3 Views    Result Date: 9/18/2021  EXAM: XR ANKLE RIGHT G/E 3 VIEWS LOCATION: [facility] DATE/TIME: 9/18/2021 9:56 AM INDICATION: Pain following injury COMPARISON: None.     IMPRESSION: Acute nondisplaced fracture involving the distal fibular diaphysis and metaphysis. No additional fractures are evident. The ankle mortise is intact. The talar dome is unremarkable.     XR Ankle Right G/E 3 Views    Result Date: 9/18/2021  EXAM: XR ANKLE RIGHT G/E 3 VIEWS LOCATION: [facility] DATE/TIME: 9/18/2021 9:56 AM INDICATION: Pain following injury COMPARISON: None.     IMPRESSION: Acute nondisplaced fracture involving the distal fibular diaphysis and metaphysis. No additional fractures are evident. The ankle mortise is intact. The talar dome is unremarkable.         Kaden Navas; JW  [facility] Foot & Ankle Surgery/Podiatry             Again, thank you for allowing me to participate in the care of your patient.        Sincerely,        Kaden Navas,  JW

## 2021-09-29 NOTE — PROGRESS NOTES
FOOT AND ANKLE SURGERY/PODIATRY CONSULT NOTE         ASSESSMENT:   Nondisplaced spiral oblique fracture right fibula    TREATMENT:  I recommend the patient remain total nonweightbearing in a cam boot next 5 weeks..  The patient is to return to the clinic in 5 weeks for follow-up x-ray.        HPI: I was asked to see Timothy Thapa today to evaluate and treat a fracture of the right ankle.  The patient indicated that he injured his right ankle in September 17 of this year.  The patient was seen by primary care physician.  An x-ray was taken of the right ankle.  The x-ray indicated fracture of the fibula.  The patient has been nonweightbearing since the time of the injury.  He is currently in a posterior splint.  He stated that the swelling has improved.  His pain is well managed.  He has no other complaints.  The patient was seen in consultation at the request of Mason Bravo MD for for evaluation and treatment of right ankle fracture..     Past Medical History:   Diagnosis Date     Depressive disorder        Social History     Socioeconomic History     Marital status:      Spouse name: Not on file     Number of children: Not on file     Years of education: Not on file     Highest education level: Not on file   Occupational History     Not on file   Tobacco Use     Smoking status: Never Smoker     Smokeless tobacco: Never Used   Substance and Sexual Activity     Alcohol use: Yes     Comment: 3 Beers/wk     Drug use: Yes     Types: Marijuana     Sexual activity: Yes     Partners: Male   Other Topics Concern     Parent/sibling w/ CABG, MI or angioplasty before 65F 55M? No   Social History Narrative     Not on file     Social Determinants of Health     Financial Resource Strain:      Difficulty of Paying Living Expenses:    Food Insecurity:      Worried About Running Out of Food in the Last Year:      Ran Out of Food in the Last Year:    Transportation Needs:      Lack of Transportation (Medical):      Lack of  Transportation (Non-Medical):    Physical Activity:      Days of Exercise per Week:      Minutes of Exercise per Session:    Stress:      Feeling of Stress :    Social Connections:      Frequency of Communication with Friends and Family:      Frequency of Social Gatherings with Friends and Family:      Attends Bahai Services:      Active Member of Clubs or Organizations:      Attends Club or Organization Meetings:      Marital Status:    Intimate Partner Violence:      Fear of Current or Ex-Partner:      Emotionally Abused:      Physically Abused:      Sexually Abused:           Allergies   Allergen Reactions     Amoxicillin Hives     Pcn [Penicillins]           Current Outpatient Medications:      escitalopram (LEXAPRO) 20 MG tablet, Take 0.5-1 tablets (10-20 mg) by mouth daily, Disp: 90 tablet, Rfl: 3     Family History   Problem Relation Age of Onset     Other Cancer Mother         Pancreatic Cancer     Depression Mother      Anxiety Disorder Mother      Mental Illness Mother         Social History     Socioeconomic History     Marital status:      Spouse name: Not on file     Number of children: Not on file     Years of education: Not on file     Highest education level: Not on file   Occupational History     Not on file   Tobacco Use     Smoking status: Never Smoker     Smokeless tobacco: Never Used   Substance and Sexual Activity     Alcohol use: Yes     Comment: 3 Beers/wk     Drug use: Yes     Types: Marijuana     Sexual activity: Yes     Partners: Male   Other Topics Concern     Parent/sibling w/ CABG, MI or angioplasty before 65F 55M? No   Social History Narrative     Not on file     Social Determinants of Health     Financial Resource Strain:      Difficulty of Paying Living Expenses:    Food Insecurity:      Worried About Running Out of Food in the Last Year:      Ran Out of Food in the Last Year:    Transportation Needs:      Lack of Transportation (Medical):      Lack of Transportation  (Non-Medical):    Physical Activity:      Days of Exercise per Week:      Minutes of Exercise per Session:    Stress:      Feeling of Stress :    Social Connections:      Frequency of Communication with Friends and Family:      Frequency of Social Gatherings with Friends and Family:      Attends Druze Services:      Active Member of Clubs or Organizations:      Attends Club or Organization Meetings:      Marital Status:    Intimate Partner Violence:      Fear of Current or Ex-Partner:      Emotionally Abused:      Physically Abused:      Sexually Abused:         Review of Systems - Patient denies fever, chills, rash, wound, stiffness, limping, numbness, weakness, heart burn, blood in stool, chest pain with activity, calf pain when walking, shortness of breath with activity, chronic cough, easy bleeding/bruising, swelling of ankles, excessive thirst, fatigue, depression, anxiety.  Patient admits to right ankle fracture.      OBJECTIVE:  Appearance: alert, well appearing, and in no distress.    Pulse 88   Ht 1.829 m (6')   Wt 95.3 kg (210 lb)   SpO2 98%   BMI 28.48 kg/m       Body mass index is 28.48 kg/m .     General appearance: Patient is alert and fully cooperative with history & exam.  No sign of distress is noted during the visit.  Psychiatric: Affect is pleasant & appropriate.  Patient appears motivated to improve health.  Respiratory: Breathing is regular & unlabored while sitting.  HEENT: Hearing is intact to spoken word.  Speech is clear.  No gross evidence of visual impairment that would impact ambulation.    Vascular: Dorsalis pedis and posterior tibial pulses are palpable. There is pedal hair growth bilaterally.  CFT < 3 sec from anterior tibial surface to distal digits bilaterally. There is no appreciable edema noted.  Dermatologic: Turgor and texture are within normal limits. No coloration or temperature changes. No primary or secondary lesions noted.  Neurologic: All epicritic and  proprioceptive sensations are grossly intact bilaterally.  Musculoskeletal: All active and passive ankle, subtalar, midtarsal, and 1st MPJ range of motion are grossly intact without pain or crepitus, with the exception of the right ankle. Manual muscle strength is within normal limits bilaterally. All dorsiflexors, plantarflexors, invertors, evertors are intact bilaterally. Tenderness present to lateral aspect of the right ankle on palpation. Tenderness to the lateral aspect of the right ankle with range of motion. Calf is soft/non-tender without warmth/induration    Imaging:       No images are attached to the encounter or orders placed in the encounter.     XR Ankle Right G/E 3 Views    Result Date: 9/18/2021  EXAM: XR ANKLE RIGHT G/E 3 VIEWS LOCATION: [facility] DATE/TIME: 9/18/2021 9:56 AM INDICATION: Pain following injury COMPARISON: None.     IMPRESSION: Acute nondisplaced fracture involving the distal fibular diaphysis and metaphysis. No additional fractures are evident. The ankle mortise is intact. The talar dome is unremarkable.     XR Ankle Right G/E 3 Views    Result Date: 9/18/2021  EXAM: XR ANKLE RIGHT G/E 3 VIEWS LOCATION: [facility] DATE/TIME: 9/18/2021 9:56 AM INDICATION: Pain following injury COMPARISON: None.     IMPRESSION: Acute nondisplaced fracture involving the distal fibular diaphysis and metaphysis. No additional fractures are evident. The ankle mortise is intact. The talar dome is unremarkable.         Kaden Jimenez DPM  [facility] Foot & Ankle Surgery/Podiatry

## 2021-09-29 NOTE — PATIENT INSTRUCTIONS
[employer] / CAM WALKING BOOT INSTRUCTIONS  - Do NOT drive with CAM walker on. This is due to safety and legal issues.   - Do NOT wear the CAM walker on long car/train rides or on an airplane.  - Remove the CAM walker several times a day and do ankle range of motion (ROM) exercises/wiggle toes.  - It is recommended that a thick-soled shoe be worn on the other foot to offset any created leg length issue.   - The boot does not have to be worn at night.   - There is an increased risk of developing a blood clot with lower extremity immobilization. ROM exercises and knee-high compression (tenso /ACE wrap) is recommended to lower that risk.   - You should seek medical attention if you experience calf swelling and/or pain, chest pain, or shortness of breath.     If you need to return or exchange the boot, please contact [facility] @ [phone]  Remain non-weight bearing on your right leg.  Non-weight bearing means that NO weight can be placed on the affected leg. This is the most restrictive of all weight-bearing limitations. Since you are not able to bear any weight on the leg, an assistive device, such as a walker or crutches, will be necessary for you to walk.  When walking with your walker or crutches, keep your affected knee bent up and keep your toes off the floor. No weight means no weight; even the slightest bit of pressure through your leg can cause problems.

## 2021-10-01 ENCOUNTER — MYC MEDICAL ADVICE (OUTPATIENT)
Dept: PODIATRY | Facility: CLINIC | Age: 31
End: 2021-10-01

## 2021-10-02 NOTE — TELEPHONE ENCOUNTER
Timothy calling to pleasantly ask to expedite his form requests. 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    Another [employer] message with attachment sent for podiatry to fill up.    Please see attached forms:  - ADA form  - injury request form    Pt asks if this can be sent back to him via [employer] or if this can be emailed to [email or web address]    Please call pt for update. [phone]. Okay to leave detailed message.    Yue Hart RN/[place] Nurse Advisor

## 2021-10-10 ENCOUNTER — HEALTH MAINTENANCE LETTER (OUTPATIENT)
Age: 31
End: 2021-10-10

## 2021-10-20 ENCOUNTER — OFFICE VISIT (OUTPATIENT)
Dept: FAMILY MEDICINE | Facility: CLINIC | Age: 31
End: 2021-10-20
Payer: COMMERCIAL

## 2021-10-20 VITALS
WEIGHT: 216 LBS | HEART RATE: 78 BPM | TEMPERATURE: 96.7 F | BODY MASS INDEX: 29.29 KG/M2 | OXYGEN SATURATION: 98 % | SYSTOLIC BLOOD PRESSURE: 131 MMHG | DIASTOLIC BLOOD PRESSURE: 77 MMHG

## 2021-10-20 DIAGNOSIS — Z11.4 SCREENING FOR HIV (HUMAN IMMUNODEFICIENCY VIRUS): ICD-10-CM

## 2021-10-20 DIAGNOSIS — Z11.59 NEED FOR HEPATITIS C SCREENING TEST: ICD-10-CM

## 2021-10-20 PROCEDURE — 99213 OFFICE O/P EST LOW 20 MIN: CPT | Performed by: INTERNAL MEDICINE

## 2021-10-20 RX ORDER — CLINDAMYCIN HCL 300 MG
300 CAPSULE ORAL ONCE
COMMUNITY
End: 2021-11-09

## 2021-10-20 ASSESSMENT — PATIENT HEALTH QUESTIONNAIRE - PHQ9: SUM OF ALL RESPONSES TO PHQ QUESTIONS 1-9: 12

## 2021-10-20 NOTE — PROGRESS NOTES
[facility]  [address]  [place] [address]  Phone: [phone]  Primary Provider: Francisco Javier Rubio  Pre-op Performing Provider: JAMES LANGSTON      PREOPERATIVE EVALUATION:  Today's date: 10/20/2021    Timothy Thapa is a 31 year old male who presents for a preoperative evaluation.    Surgical Information:  Surgery/Procedure: Dental Surgery  Surgery Location: [facility]  Surgeon:   Surgery Date: TBD  Time of Surgery: TBD  Where patient plans to recover: At home with family  Fax number for surgical facility: Note does not need to be faxed, will be available electronically in Epic.    Type of Anesthesia Anticipated: General    Assessment & Plan     The proposed surgical procedure is considered LOW risk.    Problem List Items Addressed This Visit     None      Visit Diagnoses     Screening for HIV (human immunodeficiency virus)        Need for hepatitis C screening test                   Risks and Recommendations:  The patient has the following additional risks and recommendations for perioperative complications:   - No identified additional risk factors other than previously addressed    Medication Instructions:  Patient is to take all scheduled medications on the day of surgery    RECOMMENDATION:  APPROVAL GIVEN to proceed with proposed procedure, without further diagnostic evaluation.                      Subjective     HPI related to upcoming procedure: multiple dental caries   On clindamycin ( some diarrhea)    Preop Questions 10/20/2021   1. Have you ever had a heart attack or stroke? No   2. Have you ever had surgery on your heart or blood vessels, such as a stent placement, a coronary artery bypass, or surgery on an artery in your head, neck, heart, or legs? No   3. Do you have chest pain with activity? No   4. Do you have a history of  heart failure? No   5. Do you currently have a cold, bronchitis or symptoms of other infection? No   6.  Do you have a cough, shortness of breath, or wheezing? No   7. Do you or anyone in your family have previous history of blood clots? No   8. Do you or does anyone in your family have a serious bleeding problem such as prolonged bleeding following surgeries or cuts? No   9. Have you ever had problems with anemia or been told to take iron pills? No   10. Have you had any abnormal blood loss such as black, tarry or bloody stools? No   11. Have you ever had a blood transfusion? No   12. Are you willing to have a blood transfusion if it is medically needed before, during, or after your surgery? Yes   13. Have you or any of your relatives ever had problems with anesthesia? No   14. Do you have sleep apnea, excessive snoring or daytime drowsiness? No   15. Do you have any artifical heart valves or other implanted medical devices like a pacemaker, defibrillator, or continuous glucose monitor? No   16. Do you have artificial joints? No   17. Are you allergic to latex? No       Health Care Directive:  Patient does not have a Health Care Directive or Living Will: Discussed advance care planning with patient; information given to patient to review.    Preoperative Review of :   reviewed - no record of controlled substances prescribed.          Review of Systems  CONSTITUTIONAL: NEGATIVE for fever, chills, change in weight  ENT/MOUTH: NEGATIVE for ear, mouth and throat problems  RESP: NEGATIVE for significant cough or SOB  CV: NEGATIVE for chest pain, palpitations or peripheral edema    Patient Active Problem List    Diagnosis Date Noted     Major depressive disorder, recurrent episode, in partial remission (H) 02/27/2018     Priority: Medium     NEETU (generalized anxiety disorder) 02/27/2018     Priority: Medium      Past Medical History:   Diagnosis Date     Depressive disorder      No past surgical history on file.  Current Outpatient Medications   Medication Sig Dispense Refill     clindamycin (CLEOCIN) 300 MG capsule  Take 300 mg by mouth once       escitalopram (LEXAPRO) 20 MG tablet Take 0.5-1 tablets (10-20 mg) by mouth daily 90 tablet 3       Allergies   Allergen Reactions     Amoxicillin Hives     Pcn [Penicillins]         Social History     Tobacco Use     Smoking status: Never Smoker     Smokeless tobacco: Never Used   Substance Use Topics     Alcohol use: Yes     Comment: 3 Beers/wk       History   Drug Use     Types: Marijuana         Objective     /77 (BP Location: Right arm, Patient Position: Chair, Cuff Size: Adult Large)   Pulse 78   Temp (!) 96.7  F (35.9  C) (Tympanic)   Wt 98 kg (216 lb)   SpO2 98%   BMI 29.29 kg/m      Physical Exam  GENERAL APPEARANCE: healthy, alert and no distress  HENT: ear canals and TM's normal and nose and mouth without ulcers or lesions  RESP: lungs clear to auscultation - no rales, rhonchi or wheezes  CV: regular rate and rhythm, normal S1 S2, no S3 or S4 and no murmur, click or rub   ABDOMEN: soft, nontender, no HSM or masses and bowel sounds normal  NEURO: Normal strength and tone, sensory exam grossly normal, mentation intact and speech normal    No results for input(s): HGB, PLT, INR, NA, POTASSIUM, CR, A1C in the last 16797 hours.     Diagnostics:  No labs were ordered during this visit.   No EKG required for low risk surgery (cataract, skin procedure, breast biopsy, etc).    Revised Cardiac Risk Index (RCRI):  The patient has the following serious cardiovascular risks for perioperative complications:   - No serious cardiac risks = 0 points     RCRI Interpretation: 0 points: Class I (very low risk - 0.4% complication rate)           Signed Electronically by: Hugo Davis MD  Copy of this evaluation report is provided to requesting physician.

## 2021-11-09 ENCOUNTER — ANCILLARY PROCEDURE (OUTPATIENT)
Dept: RADIOLOGY | Facility: CLINIC | Age: 31
End: 2021-11-09
Attending: PODIATRIST
Payer: COMMERCIAL

## 2021-11-09 ENCOUNTER — OFFICE VISIT (OUTPATIENT)
Dept: PODIATRY | Facility: CLINIC | Age: 31
End: 2021-11-09
Payer: COMMERCIAL

## 2021-11-09 VITALS — WEIGHT: 210 LBS | HEART RATE: 80 BPM | OXYGEN SATURATION: 96 % | HEIGHT: 72 IN | BODY MASS INDEX: 28.44 KG/M2

## 2021-11-09 DIAGNOSIS — S82.891D CLOSED FRACTURE OF RIGHT ANKLE WITH ROUTINE HEALING, SUBSEQUENT ENCOUNTER: Primary | ICD-10-CM

## 2021-11-09 PROCEDURE — 99207 PR FRACTURE CARE IN GLOBAL PERIOD: CPT | Performed by: PODIATRIST

## 2021-11-09 ASSESSMENT — MIFFLIN-ST. JEOR: SCORE: 1955.55

## 2021-11-09 ASSESSMENT — PAIN SCALES - GENERAL: PAINLEVEL: NO PAIN (1)

## 2021-11-09 NOTE — LETTER
11/9/2021         RE: Timothy Thapa  [address] [facility] [address]        Dear Colleague,    Thank you for referring your patient, Timothy Thapa, to the [facility]. Please see a copy of my visit note below.    FOOT AND ANKLE SURGERY/PODIATRY Progress Note        ASSESSMENT:   Nondisplaced spiral oblique fracture right fibula     TREATMENT:  An x-ray of the right ankle was taken today.  The x-rays were read and interpreted by this physician today.  I informed the patient that the x-rays show the fracture well healed and he may now gradually return to normal activities.  I told the patient that if he has prolonged pain and swelling I will refer him to physical therapy.             HPI: Timothy Thapa return to the clinic today for follow-up evaluation of a fracture of the fracture of the right ankle.   The patient has been nonweightbearing in a cam boot for the past 6 weeks.  The patient stated he is doing extremely well.  He has no complaints.     Past Medical History:   Diagnosis Date     Depressive disorder         No past surgical history on file.    Allergies   Allergen Reactions     Amoxicillin Hives     Pcn [Penicillins]          Current Outpatient Medications:      clindamycin (CLEOCIN) 300 MG capsule, Take 300 mg by mouth once, Disp: , Rfl:      escitalopram (LEXAPRO) 20 MG tablet, Take 0.5-1 tablets (10-20 mg) by mouth daily, Disp: 90 tablet, Rfl: 3    Family History   Problem Relation Age of Onset     Other Cancer Mother         Pancreatic Cancer     Depression Mother      Anxiety Disorder Mother      Mental Illness Mother        Social History     Socioeconomic History     Marital status:      Spouse name: Not on file     Number of children: Not on file     Years of education: Not on file     Highest education level: Not on file   Occupational History     Not on file   Tobacco Use     Smoking status: Never Smoker     Smokeless tobacco: Never Used   Vaping  Use     Vaping Use: Never used   Substance and Sexual Activity     Alcohol use: Yes     Comment: 3 Beers/wk     Drug use: Yes     Types: Marijuana     Sexual activity: Yes     Partners: Male   Other Topics Concern     Parent/sibling w/ CABG, MI or angioplasty before 65F 55M? No   Social History Narrative     Not on file     Social Determinants of Health     Financial Resource Strain: Not on file   Food Insecurity: Not on file   Transportation Needs: Not on file   Physical Activity: Not on file   Stress: Not on file   Social Connections: Not on file   Intimate Partner Violence: Not on file   Housing Stability: Not on file       10 point Review of Systems is negative     There were no vitals taken for this visit.    BMI= There is no height or weight on file to calculate BMI.    OBJECTIVE:  General appearance: Patient is alert and fully cooperative with history & exam.  No sign of distress is noted during the visit.  Vascular: Dorsalis pedis and posterior tibial pulses are palpable. There is pedal hair growth bilaterally.  CFT < 3 sec from anterior tibial surface to distal digits bilaterally. There is no appreciable edema noted.  Dermatologic: Turgor and texture are within normal limits. No coloration or temperature changes. No primary or secondary lesions noted.  Neurologic: All epicritic and proprioceptive sensations are grossly intact bilaterally.  Musculoskeletal: All active and passive ankle, subtalar, midtarsal, and 1st MPJ range of motion are grossly intact without pain or crepitus, with the exception of the right ankle. Manual muscle strength is within normal limits bilaterally. All dorsiflexors, plantarflexors, invertors, evertors are intact bilaterally.  No tenderness present to lateral aspect of the right ankle on palpation.  No tenderness to the lateral aspect of the right ankle with range of motion. Calf is soft/non-tender without warmth/induration    Imaging:         No results found.         Kaden  Yosef; JW  [facility] Foot & Ankle Surgery/Podiatry         Again, thank you for allowing me to participate in the care of your patient.        Sincerely,        Kaden Navas DPM

## 2021-11-09 NOTE — PATIENT INSTRUCTIONS
Remain non-weight bearing on your right leg.  Non-weight bearing means that NO weight can be placed on the affected leg. This is the most restrictive of all weight-bearing limitations. Since you are not able to bear any weight on the leg, an assistive device, such as a walker or crutches, will be necessary for you to walk.  When walking with your walker or crutches, keep your affected knee bent up and keep your toes off the floor. No weight means no weight; even the slightest bit of pressure through your leg can cause problems.

## 2021-11-09 NOTE — PROGRESS NOTES
FOOT AND ANKLE SURGERY/PODIATRY Progress Note        ASSESSMENT:   Nondisplaced spiral oblique fracture right fibula     TREATMENT:  An x-ray of the right ankle was taken today.  The x-rays were read and interpreted by this physician today.  I informed the patient that the x-rays show incomplete consolidation of the spiral oblique fracture of the fibula.  Recommended the patient remain total nonweightbearing for the next 4 weeks.  He is to return to the clinic in 1 month for serial x-ray.          HPI: Timothy Thapa return to the clinic today for follow-up evaluation of a fracture of the fracture of the right ankle.   The patient has been nonweightbearing in a cam boot for the past 6 weeks.  The patient stated he is doing extremely well.  He has no complaints.  The patient stated he he did bear some weight on the foot and ankle during the last week for short bit of time.    Past Medical History:   Diagnosis Date     Depressive disorder         No past surgical history on file.    Allergies   Allergen Reactions     Amoxicillin Hives     Pcn [Penicillins]          Current Outpatient Medications:      clindamycin (CLEOCIN) 300 MG capsule, Take 300 mg by mouth once, Disp: , Rfl:      escitalopram (LEXAPRO) 20 MG tablet, Take 0.5-1 tablets (10-20 mg) by mouth daily, Disp: 90 tablet, Rfl: 3    Family History   Problem Relation Age of Onset     Other Cancer Mother         Pancreatic Cancer     Depression Mother      Anxiety Disorder Mother      Mental Illness Mother        Social History     Socioeconomic History     Marital status:      Spouse name: Not on file     Number of children: Not on file     Years of education: Not on file     Highest education level: Not on file   Occupational History     Not on file   Tobacco Use     Smoking status: Never Smoker     Smokeless tobacco: Never Used   Vaping Use     Vaping Use: Never used   Substance and Sexual Activity     Alcohol use: Yes     Comment: 3 Beers/wk     Drug  use: Yes     Types: Marijuana     Sexual activity: Yes     Partners: Male   Other Topics Concern     Parent/sibling w/ CABG, MI or angioplasty before 65F 55M? No   Social History Narrative     Not on file     Social Determinants of Health     Financial Resource Strain: Not on file   Food Insecurity: Not on file   Transportation Needs: Not on file   Physical Activity: Not on file   Stress: Not on file   Social Connections: Not on file   Intimate Partner Violence: Not on file   Housing Stability: Not on file       10 point Review of Systems is negative     There were no vitals taken for this visit.    BMI= There is no height or weight on file to calculate BMI.    OBJECTIVE:  General appearance: Patient is alert and fully cooperative with history & exam.  No sign of distress is noted during the visit.  Vascular: Dorsalis pedis and posterior tibial pulses are palpable. There is pedal hair growth bilaterally.  CFT < 3 sec from anterior tibial surface to distal digits bilaterally. There is no appreciable edema noted.  Dermatologic: Turgor and texture are within normal limits. No coloration or temperature changes. No primary or secondary lesions noted.  Neurologic: All epicritic and proprioceptive sensations are grossly intact bilaterally.  Musculoskeletal: All active and passive ankle, subtalar, midtarsal, and 1st MPJ range of motion are grossly intact without pain or crepitus, with the exception of the right ankle. Manual muscle strength is within normal limits bilaterally. All dorsiflexors, plantarflexors, invertors, evertors are intact bilaterally.  No tenderness present to lateral aspect of the right ankle on palpation.  No tenderness to the lateral aspect of the right ankle with range of motion. Calf is soft/non-tender without warmth/induration    Imaging:         No results found.         Kaden Navas; JW  [facility] Foot & Ankle Surgery/Podiatry

## 2021-12-07 ENCOUNTER — ANCILLARY PROCEDURE (OUTPATIENT)
Dept: RADIOLOGY | Facility: CLINIC | Age: 31
End: 2021-12-07
Attending: PODIATRIST
Payer: COMMERCIAL

## 2021-12-07 ENCOUNTER — OFFICE VISIT (OUTPATIENT)
Dept: PODIATRY | Facility: CLINIC | Age: 31
End: 2021-12-07
Payer: COMMERCIAL

## 2021-12-07 VITALS — HEIGHT: 72 IN | BODY MASS INDEX: 28.44 KG/M2 | WEIGHT: 210 LBS | HEART RATE: 78 BPM | OXYGEN SATURATION: 97 %

## 2021-12-07 DIAGNOSIS — S82.891D CLOSED FRACTURE OF RIGHT ANKLE WITH ROUTINE HEALING, SUBSEQUENT ENCOUNTER: Primary | ICD-10-CM

## 2021-12-07 PROCEDURE — 99207 PR FRACTURE CARE IN GLOBAL PERIOD: CPT | Performed by: PODIATRIST

## 2021-12-07 ASSESSMENT — PAIN SCALES - GENERAL: PAINLEVEL: NO PAIN (1)

## 2021-12-07 ASSESSMENT — MIFFLIN-ST. JEOR: SCORE: 1955.55

## 2021-12-07 NOTE — LETTER
12/7/2021         RE: Timothy Thapa  [address] [facility] [address]        Dear Colleague,    Thank you for referring your patient, Timothy Thapa, to the [facility]. Please see a copy of my visit note below.    FOOT AND ANKLE SURGERY/PODIATRY Progress Note        ASSESSMENT:   Nondisplaced spiral oblique fracture right fibula     TREATMENT:  An x-ray of the right ankle was taken today.  The x-rays were read and interpreted by this physician today.  I informed the patient that the x-rays show incomplete consolidation of the spiral oblique fracture of the fibula but there has been significant improvement in the progression of pain bone healing..  Recommended the patient remain total nonweightbearing for the next 4 weeks.  He is to return to the clinic in 1 month for serial x-ray.  The patient is to remain total nonweightbearing.      HPI: Timothy Thapa return to the clinic today for follow-up evaluation of a fracture of the fracture of the right ankle.   The patient has been nonweightbearing in a cam boot for the past 6 weeks.  The patient stated he is doing extremely well.  He has no complaints.  The patient stated he he did bear some weight on the foot and ankle during the last week for short bit of time.  The patient also indicated that he had a severe fall at least twice since his last visit.       Past Medical History:   Diagnosis Date     Depressive disorder         History reviewed. No pertinent surgical history.    Allergies   Allergen Reactions     Amoxicillin Hives     Pcn [Penicillins]          Current Outpatient Medications:      escitalopram (LEXAPRO) 20 MG tablet, Take 0.5-1 tablets (10-20 mg) by mouth daily, Disp: 90 tablet, Rfl: 3    Family History   Problem Relation Age of Onset     Other Cancer Mother         Pancreatic Cancer     Depression Mother      Anxiety Disorder Mother      Mental Illness Mother        Social History     Socioeconomic History      Marital status:      Spouse name: Not on file     Number of children: Not on file     Years of education: Not on file     Highest education level: Not on file   Occupational History     Not on file   Tobacco Use     Smoking status: Never Smoker     Smokeless tobacco: Never Used   Vaping Use     Vaping Use: Never used   Substance and Sexual Activity     Alcohol use: Yes     Comment: 3 Beers/wk     Drug use: Yes     Types: Marijuana     Sexual activity: Yes     Partners: Male   Other Topics Concern     Parent/sibling w/ CABG, MI or angioplasty before 65F 55M? No   Social History Narrative     Not on file     Social Determinants of Health     Financial Resource Strain: Not on file   Food Insecurity: Not on file   Transportation Needs: Not on file   Physical Activity: Not on file   Stress: Not on file   Social Connections: Not on file   Intimate Partner Violence: Not on file   Housing Stability: Not on file       10 point Review of Systems is negative     Pulse 78   Ht 1.829 m (6')   Wt 95.3 kg (210 lb)   SpO2 97%   BMI 28.48 kg/m      BMI= Body mass index is 28.48 kg/m .    OBJECTIVE:  General appearance: Patient is alert and fully cooperative with history & exam.  No sign of distress is noted during the visit.  Vascular: Dorsalis pedis and posterior tibial pulses are palpable. There is pedal hair growth bilaterally.  CFT < 3 sec from anterior tibial surface to distal digits bilaterally. There is no appreciable edema noted.  Dermatologic: Turgor and texture are within normal limits. No coloration or temperature changes. No primary or secondary lesions noted.  Neurologic: All epicritic and proprioceptive sensations are grossly intact bilaterally.  Musculoskeletal: All active and passive ankle, subtalar, midtarsal, and 1st MPJ range of motion are grossly intact without pain or crepitus, with the exception of the right ankle. Manual muscle strength is within normal limits bilaterally. All dorsiflexors,  plantarflexors, invertors, evertors are intact bilaterally.  No tenderness present to lateral aspect of the right ankle on palpation.  No tenderness to the lateral aspect of the right ankle with range of motion. Calf is soft/non-tender without warmth/induration    Imaging:         XR Ankle Right G/E 3 Views    Result Date: 11/9/2021  Exam performed: Right ankle Indication: Right ankle fracture Report: The AP, lateral, and ankle mortise views show that there is an incomplete consolidation of a spiral oblique fracture of the fibula.  Slight posterior displacement noted when compared to previous x-rays. Pression: Incomplete healing spiral oblique fracture right fibula.           Kaden Navas; JW  [facility] Foot & Ankle Surgery/Podiatry         Again, thank you for allowing me to participate in the care of your patient.        Sincerely,        Kaden Navas DPM

## 2021-12-07 NOTE — PROGRESS NOTES
FOOT AND ANKLE SURGERY/PODIATRY Progress Note        ASSESSMENT:   Nondisplaced spiral oblique fracture right fibula     TREATMENT:  An x-ray of the right ankle was taken today.  The x-rays were read and interpreted by this physician today.  I informed the patient that the x-rays show incomplete consolidation of the spiral oblique fracture of the fibula but there has been significant improvement in the progression of pain bone healing..  Recommended the patient remain total nonweightbearing for the next 4 weeks.  He is to return to the clinic in 1 month for serial x-ray.  The patient is to remain total nonweightbearing.      HPI: Timothy Thapa return to the clinic today for follow-up evaluation of a fracture of the fracture of the right ankle.   The patient has been nonweightbearing in a cam boot for the past 6 weeks.  The patient stated he is doing extremely well.  He has no complaints.  The patient stated he he did bear some weight on the foot and ankle during the last week for short bit of time.  The patient also indicated that he had a severe fall at least twice since his last visit.       Past Medical History:   Diagnosis Date     Depressive disorder         History reviewed. No pertinent surgical history.    Allergies   Allergen Reactions     Amoxicillin Hives     Pcn [Penicillins]          Current Outpatient Medications:      escitalopram (LEXAPRO) 20 MG tablet, Take 0.5-1 tablets (10-20 mg) by mouth daily, Disp: 90 tablet, Rfl: 3    Family History   Problem Relation Age of Onset     Other Cancer Mother         Pancreatic Cancer     Depression Mother      Anxiety Disorder Mother      Mental Illness Mother        Social History     Socioeconomic History     Marital status:      Spouse name: Not on file     Number of children: Not on file     Years of education: Not on file     Highest education level: Not on file   Occupational History     Not on file   Tobacco Use     Smoking status: Never Smoker      Smokeless tobacco: Never Used   Vaping Use     Vaping Use: Never used   Substance and Sexual Activity     Alcohol use: Yes     Comment: 3 Beers/wk     Drug use: Yes     Types: Marijuana     Sexual activity: Yes     Partners: Male   Other Topics Concern     Parent/sibling w/ CABG, MI or angioplasty before 65F 55M? No   Social History Narrative     Not on file     Social Determinants of Health     Financial Resource Strain: Not on file   Food Insecurity: Not on file   Transportation Needs: Not on file   Physical Activity: Not on file   Stress: Not on file   Social Connections: Not on file   Intimate Partner Violence: Not on file   Housing Stability: Not on file       10 point Review of Systems is negative     Pulse 78   Ht 1.829 m (6')   Wt 95.3 kg (210 lb)   SpO2 97%   BMI 28.48 kg/m      BMI= Body mass index is 28.48 kg/m .    OBJECTIVE:  General appearance: Patient is alert and fully cooperative with history & exam.  No sign of distress is noted during the visit.  Vascular: Dorsalis pedis and posterior tibial pulses are palpable. There is pedal hair growth bilaterally.  CFT < 3 sec from anterior tibial surface to distal digits bilaterally. There is no appreciable edema noted.  Dermatologic: Turgor and texture are within normal limits. No coloration or temperature changes. No primary or secondary lesions noted.  Neurologic: All epicritic and proprioceptive sensations are grossly intact bilaterally.  Musculoskeletal: All active and passive ankle, subtalar, midtarsal, and 1st MPJ range of motion are grossly intact without pain or crepitus, with the exception of the right ankle. Manual muscle strength is within normal limits bilaterally. All dorsiflexors, plantarflexors, invertors, evertors are intact bilaterally.  No tenderness present to lateral aspect of the right ankle on palpation.  No tenderness to the lateral aspect of the right ankle with range of motion. Calf is soft/non-tender without  warmth/induration    Imaging:         XR Ankle Right G/E 3 Views    Result Date: 11/9/2021  Exam performed: Right ankle Indication: Right ankle fracture Report: The AP, lateral, and ankle mortise views show that there is an incomplete consolidation of a spiral oblique fracture of the fibula.  Slight posterior displacement noted when compared to previous x-rays. Pression: Incomplete healing spiral oblique fracture right fibula.           Kaden Navas; JW  [facility] Foot & Ankle Surgery/Podiatry

## 2022-01-04 ENCOUNTER — OFFICE VISIT (OUTPATIENT)
Dept: PODIATRY | Facility: CLINIC | Age: 32
End: 2022-01-04
Payer: COMMERCIAL

## 2022-01-04 VITALS
OXYGEN SATURATION: 98 % | WEIGHT: 210 LBS | HEART RATE: 68 BPM | TEMPERATURE: 99 F | HEIGHT: 72 IN | BODY MASS INDEX: 28.44 KG/M2

## 2022-01-04 DIAGNOSIS — S82.434K: Primary | ICD-10-CM

## 2022-01-04 PROCEDURE — 99213 OFFICE O/P EST LOW 20 MIN: CPT | Performed by: PODIATRIST

## 2022-01-04 ASSESSMENT — MIFFLIN-ST. JEOR: SCORE: 1955.55

## 2022-01-04 ASSESSMENT — PAIN SCALES - GENERAL: PAINLEVEL: NO PAIN (1)

## 2022-01-04 NOTE — LETTER
1/4/2022         RE: Timothy Thapa  [address] [facility] [address]        Dear Colleague,    Thank you for referring your patient, Timothy Thapa, to the [facility]. Please see a copy of my visit note below.    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  The x-rays were read and interpreted by this physician today.  I informed the patient that the x-rays show incomplete consolidation of the spiral oblique fracture of the fibula but there has been significant improvement in the progression of pain bone healing..  Recommended the patient remain total nonweightbearing for the next 4 weeks.  I have also recommended utilizing a bone stimulator to assist with bone healing.     HPI: Timothy Thapa return to the clinic today for follow-up evaluation of a fracture of the fracture of the right ankle.   The patient has been nonweightbearing in a cam boot for the past 6 weeks.  The patient stated he is doing extremely well.  He has no complaints.  The patient stated he he did bear some weight on the foot and ankle during the last week for short bit of time.  The patient also indicated that he had a severe fall at least twice since his last visit.     Past Medical History:   Diagnosis Date     Depressive disorder         No past surgical history on file.    Allergies   Allergen Reactions     Amoxicillin Hives     Pcn [Penicillins]          Current Outpatient Medications:      escitalopram (LEXAPRO) 20 MG tablet, Take 0.5-1 tablets (10-20 mg) by mouth daily, Disp: 90 tablet, Rfl: 3    Family History   Problem Relation Age of Onset     Other Cancer Mother         Pancreatic Cancer     Depression Mother      Anxiety Disorder Mother      Mental Illness Mother        Social History     Socioeconomic History     Marital status:      Spouse  name: Not on file     Number of children: Not on file     Years of education: Not on file     Highest education level: Not on file   Occupational History     Not on file   Tobacco Use     Smoking status: Never Smoker     Smokeless tobacco: Never Used   Vaping Use     Vaping Use: Never used   Substance and Sexual Activity     Alcohol use: Yes     Comment: 3 Beers/wk     Drug use: Yes     Types: Marijuana     Sexual activity: Yes     Partners: Male   Other Topics Concern     Parent/sibling w/ CABG, MI or angioplasty before 65F 55M? No   Social History Narrative     Not on file     Social Determinants of Health     Financial Resource Strain: Not on file   Food Insecurity: Not on file   Transportation Needs: Not on file   Physical Activity: Not on file   Stress: Not on file   Social Connections: Not on file   Intimate Partner Violence: Not on file   Housing Stability: Not on file       10 point Review of Systems is negative     Pulse 68   Temp 99  F (37.2  C) (Temporal)   Ht 1.829 m (6')   Wt 95.3 kg (210 lb)   SpO2 98%   BMI 28.48 kg/m      BMI= Body mass index is 28.48 kg/m .    OBJECTIVE:  General appearance: Patient is alert and fully cooperative with history & exam.  No sign of distress is noted during the visit.  Vascular: Dorsalis pedis and posterior tibial pulses are palpable. There is pedal hair growth bilaterally.  CFT < 3 sec from anterior tibial surface to distal digits bilaterally. There is no appreciable edema noted.  Dermatologic: Turgor and texture are within normal limits. No coloration or temperature changes. No primary or secondary lesions noted.  Neurologic: All epicritic and proprioceptive sensations are grossly intact bilaterally.  Musculoskeletal: All active and passive ankle, subtalar, midtarsal, and 1st MPJ range of motion are grossly intact without pain or crepitus, with the exception of the right ankle. Manual muscle strength is within normal limits bilaterally. All dorsiflexors,  plantarflexors, invertors, evertors are intact bilaterally.  No tenderness present to lateral aspect of the right ankle on palpation.  No tenderness to the lateral aspect of the right ankle with range of motion. Calf is soft/non-tender without warmth/induration    Imaging:         XR Ankle Right G/E 3 Views    Result Date: 12/7/2021  Exam performed: Right ankle Indication: Ankle fracture Report: The AP, lateral and oblique views show continued consolidation of the spiral oblique fracture of the fibula.  All osseous structures are well aligned.  Ankle mortise aligned. Impression: Healing ankle fracture right ankle           Kaden Jimenez DPM  [facility] Foot & Ankle Surgery/Podiatry         Again, thank you for allowing me to participate in the care of your patient.        Sincerely,        Kaden Navas DPM

## 2022-01-04 NOTE — PROGRESS NOTES
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  The x-rays were read and interpreted by this physician today.  I informed the patient that the x-rays show incomplete consolidation of the spiral oblique fracture of the fibula but there has been significant improvement in the progression of pain bone healing.  It is less than 1 cm fracture gap.  Recommended the patient remain total nonweightbearing for the next 4 weeks.  I have also recommended utilizing a bone stimulator to assist with bone healing.     HPI: Timothy Thapa return to the clinic today for follow-up evaluation of a fracture of the fracture of the right ankle.   The patient has been nonweightbearing in a cam boot for the past 6 weeks.  The patient stated he is doing extremely well.  He has no complaints.  The patient stated he he did bear some weight on the foot and ankle during the last week for short bit of time.  The patient also indicated that he had a severe fall at least twice since his last visit.     Past Medical History:   Diagnosis Date     Depressive disorder         No past surgical history on file.    Allergies   Allergen Reactions     Amoxicillin Hives     Pcn [Penicillins]          Current Outpatient Medications:      escitalopram (LEXAPRO) 20 MG tablet, Take 0.5-1 tablets (10-20 mg) by mouth daily, Disp: 90 tablet, Rfl: 3    Family History   Problem Relation Age of Onset     Other Cancer Mother         Pancreatic Cancer     Depression Mother      Anxiety Disorder Mother      Mental Illness Mother        Social History     Socioeconomic History     Marital status:      Spouse name: Not on file     Number of children: Not on file     Years of education: Not on file     Highest education level: Not on file   Occupational History     Not on file   Tobacco Use     Smoking status: Never Smoker      Smokeless tobacco: Never Used   Vaping Use     Vaping Use: Never used   Substance and Sexual Activity     Alcohol use: Yes     Comment: 3 Beers/wk     Drug use: Yes     Types: Marijuana     Sexual activity: Yes     Partners: Male   Other Topics Concern     Parent/sibling w/ CABG, MI or angioplasty before 65F 55M? No   Social History Narrative     Not on file     Social Determinants of Health     Financial Resource Strain: Not on file   Food Insecurity: Not on file   Transportation Needs: Not on file   Physical Activity: Not on file   Stress: Not on file   Social Connections: Not on file   Intimate Partner Violence: Not on file   Housing Stability: Not on file       10 point Review of Systems is negative     Pulse 68   Temp 99  F (37.2  C) (Temporal)   Ht 1.829 m (6')   Wt 95.3 kg (210 lb)   SpO2 98%   BMI 28.48 kg/m      BMI= Body mass index is 28.48 kg/m .    OBJECTIVE:  General appearance: Patient is alert and fully cooperative with history & exam.  No sign of distress is noted during the visit.  Vascular: Dorsalis pedis and posterior tibial pulses are palpable. There is pedal hair growth bilaterally.  CFT < 3 sec from anterior tibial surface to distal digits bilaterally. There is no appreciable edema noted.  Dermatologic: Turgor and texture are within normal limits. No coloration or temperature changes. No primary or secondary lesions noted.  Neurologic: All epicritic and proprioceptive sensations are grossly intact bilaterally.  Musculoskeletal: All active and passive ankle, subtalar, midtarsal, and 1st MPJ range of motion are grossly intact without pain or crepitus, with the exception of the right ankle. Manual muscle strength is within normal limits bilaterally. All dorsiflexors, plantarflexors, invertors, evertors are intact bilaterally.  No tenderness present to lateral aspect of the right ankle on palpation.  No tenderness to the lateral aspect of the right ankle with range of motion. Calf is  soft/non-tender without warmth/induration    Imaging:         XR Ankle Right G/E 3 Views    Result Date: 12/7/2021  Exam performed: Right ankle Indication: Ankle fracture Report: The AP, lateral and oblique views show continued consolidation of the spiral oblique fracture of the fibula.  All osseous structures are well aligned.  Ankle mortise aligned. Impression: Healing ankle fracture right ankle           Kaden Navas; KENNEDYM  [facility]

## 2022-01-24 ENCOUNTER — DOCUMENTATION ONLY (OUTPATIENT)
Dept: PODIATRY | Facility: CLINIC | Age: 32
End: 2022-01-24
Payer: COMMERCIAL

## 2022-01-25 ENCOUNTER — TELEPHONE (OUTPATIENT)
Dept: PODIATRY | Facility: CLINIC | Age: 32
End: 2022-01-25
Payer: COMMERCIAL

## 2022-01-25 NOTE — TELEPHONE ENCOUNTER
Valencia is calling from [employer] regarding the bone stimulator, she states she needs the original notes from Sept when the patient was first seen. She can be reached at  [phone],  Also she wanted to update the fax number [phone]

## 2022-02-08 DIAGNOSIS — F33.41 MAJOR DEPRESSIVE DISORDER, RECURRENT EPISODE, IN PARTIAL REMISSION (H): ICD-10-CM

## 2022-02-09 RX ORDER — ESCITALOPRAM OXALATE 20 MG/1
10-20 TABLET ORAL DAILY
Qty: 30 TABLET | Refills: 11 | Status: SHIPPED | OUTPATIENT
Start: 2022-02-09 | End: 2023-02-16

## 2022-02-09 NOTE — TELEPHONE ENCOUNTER
Routing refill request to provider for review/approval because:  PHQ9 out of range.     PHQ 6/30/2020 1/6/2021 10/20/2021   PHQ-9 Total Score 8 16 12   Q9: Thoughts of better off dead/self-harm past 2 weeks Not at all More than half the days Not at all        Daisy Wynn RN   [facility]

## 2022-03-01 ENCOUNTER — OFFICE VISIT (OUTPATIENT)
Dept: PODIATRY | Facility: CLINIC | Age: 32
End: 2022-03-01
Payer: COMMERCIAL

## 2022-03-01 ENCOUNTER — ANCILLARY PROCEDURE (OUTPATIENT)
Dept: RADIOLOGY | Facility: CLINIC | Age: 32
End: 2022-03-01
Attending: PODIATRIST
Payer: COMMERCIAL

## 2022-03-01 VITALS — HEIGHT: 72 IN | HEART RATE: 75 BPM | WEIGHT: 210 LBS | OXYGEN SATURATION: 98 % | BODY MASS INDEX: 28.44 KG/M2

## 2022-03-01 DIAGNOSIS — S82.434K: Primary | ICD-10-CM

## 2022-03-01 PROCEDURE — 99213 OFFICE O/P EST LOW 20 MIN: CPT | Performed by: PODIATRIST

## 2022-03-01 ASSESSMENT — PAIN SCALES - GENERAL: PAINLEVEL: NO PAIN (0)

## 2022-03-01 NOTE — PROGRESS NOTES
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  The x-rays were read and interpreted by this physician today.  I informed the patient that the x-rays show almost complete consolidation of the spiral oblique fracture of the fibula.   Recommended the patient remain  partial weightbearing in the cam boot and continue to use the bone stimulator for the next 4 weeks.  He was given a new cam boot today.     HPI: Timothy Thapa return to the clinic today for follow-up evaluation of a fracture of the fracture of the right ankle.   The patient has been nonweightbearing in a cam boot for the past 6 weeks.  The patient stated he is doing extremely well.  He has no complaints.  The patient stated he he did bear some weight on the foot and ankle during the last week for short bit of time.  The patient also indicated that he had a severe fall at least twice since his last visit.    Past Medical History:   Diagnosis Date     Depressive disorder         No past surgical history on file.    Allergies   Allergen Reactions     Amoxicillin Hives     Pcn [Penicillins]          Current Outpatient Medications:      escitalopram (LEXAPRO) 20 MG tablet, TAKE 0.5-1 TABLETS (10-20 MG) BY MOUTH DAILY, Disp: 30 tablet, Rfl: 11    Family History   Problem Relation Age of Onset     Other Cancer Mother         Pancreatic Cancer     Depression Mother      Anxiety Disorder Mother      Mental Illness Mother        Social History     Socioeconomic History     Marital status:      Spouse name: Not on file     Number of children: Not on file     Years of education: Not on file     Highest education level: Not on file   Occupational History     Not on file   Tobacco Use     Smoking status: Never Smoker     Smokeless tobacco: Never Used   Vaping Use     Vaping Use: Never used   Substance and Sexual Activity      Alcohol use: Yes     Comment: 3 Beers/wk     Drug use: Yes     Types: Marijuana     Sexual activity: Yes     Partners: Male   Other Topics Concern     Parent/sibling w/ CABG, MI or angioplasty before 65F 55M? No   Social History Narrative     Not on file     Social Determinants of Health     Financial Resource Strain: Not on file   Food Insecurity: Not on file   Transportation Needs: Not on file   Physical Activity: Not on file   Stress: Not on file   Social Connections: Not on file   Intimate Partner Violence: Not on file   Housing Stability: Not on file       10 point Review of Systems is negative     There were no vitals taken for this visit.    BMI= There is no height or weight on file to calculate BMI.    OBJECTIVE:  General appearance: Patient is alert and fully cooperative with history & exam.  No sign of distress is noted during the visit.  Vascular: Dorsalis pedis and posterior tibial pulses are palpable. There is pedal hair growth bilaterally.  CFT < 3 sec from anterior tibial surface to distal digits bilaterally. There is no appreciable edema noted.  Dermatologic: Turgor and texture are within normal limits. No coloration or temperature changes. No primary or secondary lesions noted.  Neurologic: All epicritic and proprioceptive sensations are grossly intact bilaterally.  Musculoskeletal: All active and passive ankle, subtalar, midtarsal, and 1st MPJ range of motion are grossly intact without pain or crepitus, with the exception of the right ankle. Manual muscle strength is within normal limits bilaterally. All dorsiflexors, plantarflexors, invertors, evertors are intact bilaterally.  No tenderness present to lateral aspect of the right ankle on palpation.  No tenderness to the lateral aspect of the right ankle with range of motion. Calf is soft/non-tender without warmth/induration    Imaging:         No results found.           Kaden Navas; JW  [facility] Foot & Ankle Surgery/Podiatry

## 2022-03-01 NOTE — LETTER
3/1/2022         RE: Timothy Thapa  [address] [address]        Dear Colleague,    Thank you for referring your patient, Timothy Thapa, to the [facility]. Please see a copy of my visit note below.    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  The x-rays were read and interpreted by this physician today.  I informed the patient that the x-rays show almost complete consolidation of the spiral oblique fracture of the fibula.   Recommended the patient remain total partial weightbearing in the cam boot and continue to use the bone stimulator for the next 4 weeks.     HPI: Timothy Thapa return to the clinic today for follow-up evaluation of a fracture of the fracture of the right ankle.   The patient has been nonweightbearing in a cam boot for the past 6 weeks.  The patient stated he is doing extremely well.  He has no complaints.  The patient stated he he did bear some weight on the foot and ankle during the last week for short bit of time.  The patient also indicated that he had a severe fall at least twice since his last visit.    Past Medical History:   Diagnosis Date     Depressive disorder         No past surgical history on file.    Allergies   Allergen Reactions     Amoxicillin Hives     Pcn [Penicillins]          Current Outpatient Medications:      escitalopram (LEXAPRO) 20 MG tablet, TAKE 0.5-1 TABLETS (10-20 MG) BY MOUTH DAILY, Disp: 30 tablet, Rfl: 11    Family History   Problem Relation Age of Onset     Other Cancer Mother         Pancreatic Cancer     Depression Mother      Anxiety Disorder Mother      Mental Illness Mother        Social History     Socioeconomic History     Marital status:      Spouse name: Not on file     Number of children: Not on file     Years of education: Not on file      Highest education level: Not on file   Occupational History     Not on file   Tobacco Use     Smoking status: Never Smoker     Smokeless tobacco: Never Used   Vaping Use     Vaping Use: Never used   Substance and Sexual Activity     Alcohol use: Yes     Comment: 3 Beers/wk     Drug use: Yes     Types: Marijuana     Sexual activity: Yes     Partners: Male   Other Topics Concern     Parent/sibling w/ CABG, MI or angioplasty before 65F 55M? No   Social History Narrative     Not on file     Social Determinants of Health     Financial Resource Strain: Not on file   Food Insecurity: Not on file   Transportation Needs: Not on file   Physical Activity: Not on file   Stress: Not on file   Social Connections: Not on file   Intimate Partner Violence: Not on file   Housing Stability: Not on file       10 point Review of Systems is negative     There were no vitals taken for this visit.    BMI= There is no height or weight on file to calculate BMI.    OBJECTIVE:  General appearance: Patient is alert and fully cooperative with history & exam.  No sign of distress is noted during the visit.  Vascular: Dorsalis pedis and posterior tibial pulses are palpable. There is pedal hair growth bilaterally.  CFT < 3 sec from anterior tibial surface to distal digits bilaterally. There is no appreciable edema noted.  Dermatologic: Turgor and texture are within normal limits. No coloration or temperature changes. No primary or secondary lesions noted.  Neurologic: All epicritic and proprioceptive sensations are grossly intact bilaterally.  Musculoskeletal: All active and passive ankle, subtalar, midtarsal, and 1st MPJ range of motion are grossly intact without pain or crepitus, with the exception of the right ankle. Manual muscle strength is within normal limits bilaterally. All dorsiflexors, plantarflexors, invertors, evertors are intact bilaterally.  No tenderness present to lateral aspect of the right ankle on palpation.  No tenderness to  the lateral aspect of the right ankle with range of motion. Calf is soft/non-tender without warmth/induration    Imaging:         No results found.           Kaden Navas; JW  [facility] Foot & Ankle Surgery/Podiatry         Again, thank you for allowing me to participate in the care of your patient.        Sincerely,        Kaden Navas DPM

## 2022-03-16 DIAGNOSIS — S82.434K: Primary | ICD-10-CM

## 2022-03-29 ENCOUNTER — THERAPY VISIT (OUTPATIENT)
Dept: PHYSICAL THERAPY | Facility: CLINIC | Age: 32
End: 2022-03-29
Attending: PODIATRIST
Payer: COMMERCIAL

## 2022-03-29 DIAGNOSIS — S82.434K: ICD-10-CM

## 2022-03-29 PROCEDURE — 97161 PT EVAL LOW COMPLEX 20 MIN: CPT | Mod: GP | Performed by: PHYSICAL THERAPIST

## 2022-03-29 PROCEDURE — 97110 THERAPEUTIC EXERCISES: CPT | Mod: GP | Performed by: PHYSICAL THERAPIST

## 2022-03-29 PROCEDURE — 97530 THERAPEUTIC ACTIVITIES: CPT | Mod: GP | Performed by: PHYSICAL THERAPIST

## 2022-03-29 ASSESSMENT — ACTIVITIES OF DAILY LIVING (ADL)
TOTAL_ADL_ITEM_SCORE:: 62
ACTIVITIES_OF_DAILY_LIVING_MEASURE_SCORE(%):: 73.81
HIGHEST_POTENTIAL_ADL_SCORE:: 84

## 2022-03-29 NOTE — PROGRESS NOTES
Physical Therapy Initial Evaluation  March 29, 2022     MD Instructions/Precautions/Restrictions: PT eval and treat.     Therapist Impression:   Findings consistent with R fibula fracture, with related impairments limiting his ability to stand, walk. Skilled PT services are necessary in order to reduce impairments and improve independent function.     Subjective:   HPI: Timothy Thapa is a 31 year old male who presents to PT following closed non displaced R fibula fracture. Injury occurred on 9/17/21. Was treated with CAM boot NWB x6 weeks. At the time was found to have non-union, so had a further 4-5 weeks of the boot and NWB. He has been out of the boot for a period of time now, easing back into activity and work. Formerly worked as a , though now has transitioned to working as a . Will work 4x 8hr shifts starting soon.     Answers for HPI/ROS submitted by the patient on 3/29/2022  Reason for Visit:: post fibulla fracture  When problem began:: 9/18/2021  How problem occurred:: trip and fall  Number scale: 1/10  General health as reported by patient: good  Please check all that apply to your current or past medical history: none  Medical allergies: none  Surgeries: none  Medications you are currently taking: anti-depressants  Occupation:: barista  What are your primary job tasks: lifting/carrying, prolonged standing, repetitive tasks      Objective:  ANKLE/FOOT EXAMINATION    Static Posture:  Pes cavus mildly bilaterally    Gait: Lacking dynamic pronation during loading phase, decreased toe off on R.       (*Indicates patient s pain)  ROM L R   Dorsiflexion (15-20) 15 5   Plantarflexion (40-55) 50 35   Inversion (30-35) 30 20   Eversion (10-15) 15 8   Midfoot mobility: WNL bilaterally    (*Indicates patient s pain)  Resisted Isometrics Strength (MMT)    L R   DF 5 5   PF 4 3   Inversion 5 4+   Eversion 5 4+     Other:  Great Toe Ext PROM: WNL bilaterally  Palpation: Non tender along length of  fibula      Assessment/Plan:    The patient is a 31 year old male with chief complaint of R lower leg pain.    The patient has the following significant findings with corresponding treatment plan.  Diagnosis 1:  R fibula fracture    Pain -  hot/cold therapy, manual therapy, splint/taping/bracing/orthotics and self management  Decreased ROM/flexibility - manual therapy, therapeutic exercise and home program  Decreased joint mobility - manual therapy, therapeutic exercise and home program  Decreased strength - therapeutic exercise, therapeutic activities and home program  Impaired balance - neuro re-education, therapeutic activities and home program  Decreased proprioception - neuro re-education and therapeutic activities  Impaired gait - gait training and assistive devices  Impaired muscle performance - neuro re-education and home program  Decreased function - therapeutic activities and home program  Impaired posture - neuro re-education, therapeutic activities and home program  Instability -  Therapeutic Activity, Therapeutic Exercise, Neuromuscular Re-education, Splinting/Taping/Bracing/Orthotic, home program      Therapy Evaluation Codes:   1) History comprised of:   Personal factors that impact the plan of care:      Please refer to health history in EMR.    Comorbidity factors that impact the plan of care are:      Please refer to health history in EMR.     Medications impacting care: None.  2) Examination of Body Systems comprised of:   Body structures and functions that impact the plan of care:      Ankle.   Activity limitations that impact the plan of care are:      Standing, Walking and Working.   Clinical presentation characteristics are:    Stable/Uncomplicated.  3) Presentation comprised of:   Presentation scored as Low complexity with uncomplicated characteristics..  4) Decision-Making    Low complexity using standardized patient assessment instrument and/or measureable assessment of functional  outcome.  Cumulative Therapy Evaluation is: Low complexity.    Previous and current functional limitations:  (See Goal Flow Sheet for this information)    Short term and Long term goals: (See Goal Flow Sheet for this information)     Communication ability:  Patient appears to be able to clearly communicate and understand verbal and written communication and follow directions correctly.  Treatment Explanation - The following has been discussed with the patient: RX ordered/plan of care, anticipated outcomes, and possible risks and side effects.  This patient would benefit from PT intervention to resume normal activities.   Rehab potential is excellent.    Frequency:  1 X a month, once daily  Duration:  for 1 visit to establish home exercise program  Discharge Plan: Achieve all LTGs, be independent in home treatment program, and reach maximal therapeutic benefit.    Please refer to the daily flowsheet for treatment today, total treatment time and time spent performing 1:1 timed codes.

## 2022-05-21 ENCOUNTER — HEALTH MAINTENANCE LETTER (OUTPATIENT)
Age: 32
End: 2022-05-21

## 2022-08-03 ENCOUNTER — IMMUNIZATION (OUTPATIENT)
Dept: NURSING | Facility: CLINIC | Age: 32
End: 2022-08-03
Payer: COMMERCIAL

## 2022-08-03 DIAGNOSIS — Z23 HIGH PRIORITY FOR 2019-NCOV VACCINE: Primary | ICD-10-CM

## 2022-08-03 PROCEDURE — 91305 COVID-19,PF,PFIZER (12+ YRS): CPT

## 2022-08-03 PROCEDURE — 0054A COVID-19,PF,PFIZER (12+ YRS): CPT

## 2022-09-09 NOTE — PROGRESS NOTES
Per patient, bone stimulator was not covered by insurance. Writer sent the order for an [employer] bone stimulator to see if this brand is covered. Fax#[phone]  
stated

## 2022-09-17 ENCOUNTER — HEALTH MAINTENANCE LETTER (OUTPATIENT)
Age: 32
End: 2022-09-17

## 2022-10-04 ENCOUNTER — VIRTUAL VISIT (OUTPATIENT)
Dept: FAMILY MEDICINE | Facility: CLINIC | Age: 32
End: 2022-10-04
Payer: COMMERCIAL

## 2022-10-04 DIAGNOSIS — F33.41 MAJOR DEPRESSIVE DISORDER, RECURRENT EPISODE, IN PARTIAL REMISSION (H): ICD-10-CM

## 2022-10-04 DIAGNOSIS — F41.1 GAD (GENERALIZED ANXIETY DISORDER): Primary | ICD-10-CM

## 2022-10-04 PROCEDURE — 99214 OFFICE O/P EST MOD 30 MIN: CPT | Mod: 95 | Performed by: FAMILY MEDICINE

## 2022-10-04 RX ORDER — BUPROPION HYDROCHLORIDE 150 MG/1
150 TABLET ORAL EVERY MORNING
Qty: 90 TABLET | Refills: 0 | Status: SHIPPED | OUTPATIENT
Start: 2022-10-04 | End: 2023-01-13

## 2022-10-04 ASSESSMENT — PATIENT HEALTH QUESTIONNAIRE - PHQ9
5. POOR APPETITE OR OVEREATING: NEARLY EVERY DAY
SUM OF ALL RESPONSES TO PHQ QUESTIONS 1-9: 13

## 2022-10-04 ASSESSMENT — ANXIETY QUESTIONNAIRES
GAD7 TOTAL SCORE: 17
5. BEING SO RESTLESS THAT IT IS HARD TO SIT STILL: SEVERAL DAYS
2. NOT BEING ABLE TO STOP OR CONTROL WORRYING: NEARLY EVERY DAY
3. WORRYING TOO MUCH ABOUT DIFFERENT THINGS: NEARLY EVERY DAY
6. BECOMING EASILY ANNOYED OR IRRITABLE: MORE THAN HALF THE DAYS
GAD7 TOTAL SCORE: 17
1. FEELING NERVOUS, ANXIOUS, OR ON EDGE: NEARLY EVERY DAY
IF YOU CHECKED OFF ANY PROBLEMS ON THIS QUESTIONNAIRE, HOW DIFFICULT HAVE THESE PROBLEMS MADE IT FOR YOU TO DO YOUR WORK, TAKE CARE OF THINGS AT HOME, OR GET ALONG WITH OTHER PEOPLE: VERY DIFFICULT
7. FEELING AFRAID AS IF SOMETHING AWFUL MIGHT HAPPEN: MORE THAN HALF THE DAYS

## 2022-10-04 NOTE — PROGRESS NOTES
Ibeth is a 32 year old who is being evaluated via a billable video visit.      How would you like to obtain your AVS? MyChart  If the video visit is dropped, the invitation should be resent by: Text to cell phone: [phone]  Will anyone else be joining your video visit? No          Assessment & Plan     NEETU (generalized anxiety disorder)  Major depressive disorder, recurrent episode, in partial remission (H)  We discussed options and decided he would stay on the lexapro 20 and we'll add in wellbutrin 150mg/day. Continue therapy. See me in one month for reassessment, sooner prn. All questions answered. The patient indicates understanding of these issues and agrees with the plan.   - buPROPion (WELLBUTRIN XL) 150 MG 24 hr tablet; Take 1 tablet (150 mg) by mouth every morning        Return in about 4 weeks (around 11/1/2022) for Depression/Anxiety follow-up.    Michelle Flores MD  [facility]   Ibeth is a 32 year old, presenting for the following health issues:  Recheck Medication      HPI     Medication Followup of Lexapro     Taking Medication as prescribed: yes    Side Effects:  None    Medication Helping Symptoms:  Yes - but not as much as he would like     Current on lexapro at 20 mg  It is working pretty well - less effective in the last 6-8 months but has had more stress in his life recently     Likes his current job and doesn't feel that is why he is having symptoms.     Current symptoms :   More anxiety than depression  Worrying constantly   High reactions to stress   Increased panic attacks with physical symptoms , affecting his sleep and stomach upset   No si/hi   Anxiety ongoing since he was 16yo     Main side effect that he doesn't like is low libido  - more manageable     Tried zoloft in the past  - it worked for him x 3 years and then stopped it   He recalls suicidal ideation while on the zoloft     Doing therapy     NEETU-7   [employer], 2002; Used with Permission)  10/4/2022   1. Feeling nervous, anxious, or on edge 3   2. Not being able to stop or control worrying 3   3. Worrying too much about different things 3   4. Trouble relaxing 3   5. Being so restless that it is hard to sit still 1   6. Becoming easily annoyed or irritable 2   7. Feeling afraid, as if something awful might happen 2   NEETU-7 Total Score 17   If you checked any problems, how difficult have they made it for you to do your work, take care of things at home, or get along with other people? Very difficult   PHQ-9 ([employer]) 10/4/2022   1.  Little interest or pleasure in doing things 2   2.  Feeling down, depressed, or hopeless 2   3.  Trouble falling or staying asleep, or sleeping too much 2   4.  Feeling tired or having little energy 1   5.  Poor appetite or overeating 1   6.  Feeling bad about yourself 3   7.  Trouble concentrating 1   8.  Moving slowly or restless 1   9.  Suicidal or self-harm thoughts 0   PHQ-9 Total Score 13   Difficulty at work, home, or with people Very difficult       Review of Systems   Constitutional, HEENT, cardiovascular, pulmonary, gi and gu systems are negative, except as otherwise noted.      Objective           Vitals:  No vitals were obtained today due to virtual visit.    Physical Exam   GENERAL: Healthy, alert and no distress  EYES: Eyes grossly normal to inspection.  No discharge or erythema, or obvious scleral/conjunctival abnormalities.  RESP: No audible wheeze, cough, or visible cyanosis.  No visible retractions or increased work of breathing.    SKIN: Visible skin clear. No significant rash, abnormal pigmentation or lesions.  NEURO: Cranial nerves grossly intact.  Mentation and speech appropriate for age.  PSYCH: Mentation appears normal, affect normal/bright, judgement and insight intact, normal speech and appearance well-groomed.            Video-Visit Details    Video Start Time: 4:49 PM    Type of service:  Video Visit    Video End Time:5:11 PM    Originating  Location (pt. Location): Home    Distant Location (provider location):  [facility]     Platform used for Video Visit: Elizabeth

## 2022-10-11 ENCOUNTER — VIRTUAL VISIT (OUTPATIENT)
Dept: FAMILY MEDICINE | Facility: CLINIC | Age: 32
End: 2022-10-11
Payer: COMMERCIAL

## 2022-10-11 DIAGNOSIS — F41.1 GAD (GENERALIZED ANXIETY DISORDER): ICD-10-CM

## 2022-10-11 DIAGNOSIS — F33.41 MAJOR DEPRESSIVE DISORDER, RECURRENT EPISODE, IN PARTIAL REMISSION (H): Primary | ICD-10-CM

## 2022-10-11 PROCEDURE — 99213 OFFICE O/P EST LOW 20 MIN: CPT | Mod: 95 | Performed by: FAMILY MEDICINE

## 2022-10-11 NOTE — PROGRESS NOTES
Ibeth is a 32 year old who is being evaluated via a billable video visit.      How would you like to obtain your AVS? MyChart  If the video visit is dropped, the invitation should be resent by: Text to cell phone: [phone]  Will anyone else be joining your video visit? No          Assessment & Plan       Major depressive disorder, recurrent episode, in partial remission (H)  NEETU (generalized anxiety disorder)  He doesn't have a seizure disorder and drinks intermittently but not to excess. Has never had etoh seizure. Discussed with him. I think going ahead with the wellbutrin trial is safe.  He will try it. The patient indicates understanding of these issues and agrees with the plan.        BMI:   Estimated body mass index is 28.48 kg/m  as calculated from the following:    Height as of 3/1/22: 1.829 m (6').    Weight as of 3/1/22: 95.3 kg (210 lb).   Weight management plan: Discussed healthy diet and exercise guidelines    Work on weight loss  Regular exercise    No follow-ups on file.    Michelle Flores MD  [facility]   Ibeth is a 32 year old, presenting for the following health issues:  Recheck Medication      I saw him last week and we decided to augment his med with wellbutrin. He makes an appointment today to discuss side effects of wellbutrin that concern him   HPI     Medication Followup of Wellbutrin and Lexapro      Taking Medication as prescribed: yes    Side Effects:  Checked side effects- wants to discuss before starting Wellbutrin      Was worried about seizures with drinking. Patient does not have to have that in their life that much per patient but does do work with a [employer] so wondering if having a 1-2 drinks socially is okay?     Drinks occasionally - not every night  No etoh use disorder  Never had withdrawal seizure.     Doesn't have seizure disorder.      NEETU-7   [employer], 2002; Used with Permission) 10/4/2022   1. Feeling nervous, anxious, or on edge  3   2. Not being able to stop or control worrying 3   3. Worrying too much about different things 3   4. Trouble relaxing 3   5. Being so restless that it is hard to sit still 1   6. Becoming easily annoyed or irritable 2   7. Feeling afraid, as if something awful might happen 2   NEETU-7 Total Score 17   If you checked any problems, how difficult have they made it for you to do your work, take care of things at home, or get along with other people? Very difficult   PHQ-9 ([employer]) 10/4/2022   1.  Little interest or pleasure in doing things 2   2.  Feeling down, depressed, or hopeless 2   3.  Trouble falling or staying asleep, or sleeping too much 2   4.  Feeling tired or having little energy 1   5.  Poor appetite or overeating 1   6.  Feeling bad about yourself 3   7.  Trouble concentrating 1   8.  Moving slowly or restless 1   9.  Suicidal or self-harm thoughts 0   PHQ-9 Total Score 13   Difficulty at work, home, or with people Very difficult               Review of Systems   Constitutional, HEENT, cardiovascular, pulmonary, gi and gu systems are negative, except as otherwise noted.      Objective           Vitals:  No vitals were obtained today due to virtual visit.    Physical Exam   GENERAL: Healthy, alert and no distress  EYES: Eyes grossly normal to inspection.  No discharge or erythema, or obvious scleral/conjunctival abnormalities.  RESP: No audible wheeze, cough, or visible cyanosis.  No visible retractions or increased work of breathing.    SKIN: Visible skin clear. No significant rash, abnormal pigmentation or lesions.  NEURO: Cranial nerves grossly intact.  Mentation and speech appropriate for age.  PSYCH: Mentation appears normal, affect normal/bright, judgement and insight intact, normal speech and appearance well-groomed.            Video-Visit Details    Video Start Time: 3:52 PM    Type of service:  Video Visit    Video End Time:4:04    Originating Location (pt. Location): Home    Distant Location  (provider location):  [facility]     Platform used for Video Visit: Laisha

## 2022-10-14 ENCOUNTER — MYC MEDICAL ADVICE (OUTPATIENT)
Dept: FAMILY MEDICINE | Facility: CLINIC | Age: 32
End: 2022-10-14

## 2022-10-18 ENCOUNTER — VIRTUAL VISIT (OUTPATIENT)
Dept: FAMILY MEDICINE | Facility: CLINIC | Age: 32
End: 2022-10-18
Payer: COMMERCIAL

## 2022-10-18 DIAGNOSIS — Z11.4 SCREENING FOR HIV (HUMAN IMMUNODEFICIENCY VIRUS): ICD-10-CM

## 2022-10-18 DIAGNOSIS — Z53.9 ERRONEOUS ENCOUNTER--DISREGARD: Primary | ICD-10-CM

## 2022-10-18 DIAGNOSIS — Z11.59 NEED FOR HEPATITIS C SCREENING TEST: ICD-10-CM

## 2022-12-23 PROBLEM — S82.434K: Status: RESOLVED | Noted: 2022-03-29 | Resolved: 2022-12-23

## 2023-02-11 DIAGNOSIS — F33.41 MAJOR DEPRESSIVE DISORDER, RECURRENT EPISODE, IN PARTIAL REMISSION (H): ICD-10-CM

## 2023-02-11 DIAGNOSIS — F41.1 GAD (GENERALIZED ANXIETY DISORDER): ICD-10-CM

## 2023-02-13 ENCOUNTER — MYC MEDICAL ADVICE (OUTPATIENT)
Dept: FAMILY MEDICINE | Facility: CLINIC | Age: 33
End: 2023-02-13
Payer: COMMERCIAL

## 2023-02-13 NOTE — TELEPHONE ENCOUNTER
"Routing refill request to provider for review/approval because:  Labs out of range:  PHQ-9    Requested Prescriptions   Pending Prescriptions Disp Refills    buPROPion (WELLBUTRIN XL) 150 MG 24 hr tablet [Pharmacy Med Name: BUPROPION HCL  MG TABLET] 30 tablet 0     Sig: Take 1 tablet (150 mg) by mouth every morning - no further refills until seen       SSRIs Protocol Failed - 2/11/2023 10:31 AM        Failed - PHQ-9 score less than 5 in past 6 months     Please review last PHQ-9 score.           Passed - Medication is Bupropion     If the medication is Bupropion (Wellbutrin), and the patient is taking for smoking cessation; OK to refill.          Passed - Medication is active on med list        Passed - Patient is age 18 or older        Passed - Recent (6 mo) or future (30 days) visit within the authorizing provider's specialty     Patient had office visit in the last 6 months or has a visit in the next 30 days with authorizing provider or within the authorizing provider's specialty.  See \"Patient Info\" tab in inbasket, or \"Choose Columns\" in Meds & Orders section of the refill encounter.                     Judie Gallo RN 02/13/23 11:36 AM   "

## 2023-02-16 ENCOUNTER — MYC MEDICAL ADVICE (OUTPATIENT)
Dept: FAMILY MEDICINE | Facility: CLINIC | Age: 33
End: 2023-02-16
Payer: COMMERCIAL

## 2023-02-16 DIAGNOSIS — F33.41 MAJOR DEPRESSIVE DISORDER, RECURRENT EPISODE, IN PARTIAL REMISSION (H): ICD-10-CM

## 2023-02-16 RX ORDER — ESCITALOPRAM OXALATE 20 MG/1
20 TABLET ORAL DAILY
Qty: 30 TABLET | Refills: 0 | Status: SHIPPED | OUTPATIENT
Start: 2023-02-16 | End: 2023-03-15

## 2023-02-16 RX ORDER — BUPROPION HYDROCHLORIDE 150 MG/1
150 TABLET ORAL EVERY MORNING
Qty: 30 TABLET | Refills: 0 | Status: SHIPPED | OUTPATIENT
Start: 2023-02-16 | End: 2023-03-15

## 2023-03-13 DIAGNOSIS — F33.41 MAJOR DEPRESSIVE DISORDER, RECURRENT EPISODE, IN PARTIAL REMISSION (H): ICD-10-CM

## 2023-03-13 DIAGNOSIS — F41.1 GAD (GENERALIZED ANXIETY DISORDER): ICD-10-CM

## 2023-03-13 NOTE — TELEPHONE ENCOUNTER
Routing refill request to provider for review/approval because:  PHQ9: 8 on 1/2023    Kaleb Negrete RN

## 2023-03-15 RX ORDER — BUPROPION HYDROCHLORIDE 150 MG/1
150 TABLET ORAL EVERY MORNING
Qty: 30 TABLET | Refills: 0 | Status: SHIPPED | OUTPATIENT
Start: 2023-03-15 | End: 2023-04-17

## 2023-03-15 RX ORDER — ESCITALOPRAM OXALATE 20 MG/1
TABLET ORAL
Qty: 30 TABLET | Refills: 0 | Status: SHIPPED | OUTPATIENT
Start: 2023-03-15 | End: 2023-04-17

## 2023-05-01 DIAGNOSIS — F33.41 MAJOR DEPRESSIVE DISORDER, RECURRENT EPISODE, IN PARTIAL REMISSION (H): ICD-10-CM

## 2023-05-01 DIAGNOSIS — F41.1 GAD (GENERALIZED ANXIETY DISORDER): ICD-10-CM

## 2023-05-01 RX ORDER — BUPROPION HYDROCHLORIDE 150 MG/1
150 TABLET ORAL EVERY MORNING
Qty: 90 TABLET | Refills: 1 | OUTPATIENT
Start: 2023-05-01

## 2023-05-01 NOTE — TELEPHONE ENCOUNTER
Request for refill denied. Patient needs an appointment.   Alice Lyles RN on 5/1/2023 at 2:51 PM

## 2023-06-01 DIAGNOSIS — F41.1 GAD (GENERALIZED ANXIETY DISORDER): ICD-10-CM

## 2023-06-01 DIAGNOSIS — F33.41 MAJOR DEPRESSIVE DISORDER, RECURRENT EPISODE, IN PARTIAL REMISSION (H): ICD-10-CM

## 2023-06-02 ENCOUNTER — MYC MEDICAL ADVICE (OUTPATIENT)
Dept: FAMILY MEDICINE | Facility: CLINIC | Age: 33
End: 2023-06-02
Payer: COMMERCIAL

## 2023-06-02 RX ORDER — BUPROPION HYDROCHLORIDE 150 MG/1
150 TABLET ORAL EVERY MORNING
Qty: 30 TABLET | Refills: 0 | OUTPATIENT
Start: 2023-06-02

## 2023-06-02 NOTE — TELEPHONE ENCOUNTER
"Routing refill request to provider for review/approval because:  Patient needs to be seen because it has been more than 1 year since last office visit.  Need a PHQ9  Break in medication    Sent a [employer] message to patient        Last Written Prescription Date: 04/17/2023  Last Fill Quantity: 30, # refills: 0  Last office visit provider: Never been seen in clinic by Dr. Flores. Only virtually        Requested Prescriptions   Pending Prescriptions Disp Refills     buPROPion (WELLBUTRIN XL) 150 MG 24 hr tablet [Pharmacy Med Name: BUPROPION HCL  MG TABLET] 30 tablet 0     Sig: TAKE 1 TABLET (150 MG) BY MOUTH EVERY MORNING - NO FURTHER REFILLS UNTIL SEEN       SSRIs Protocol Failed - 6/1/2023  8:37 AM        Failed - PHQ-9 score less than 5 in past 6 months     Please review last PHQ-9 score.           Failed - Recent (6 mo) or future (30 days) visit within the authorizing provider's specialty     Patient had office visit in the last 6 months or has a visit in the next 30 days with authorizing provider or within the authorizing provider's specialty.  See \"Patient Info\" tab in inbasket, or \"Choose Columns\" in Meds & Orders section of the refill encounter.            Passed - Medication is Bupropion     If the medication is Bupropion (Wellbutrin), and the patient is taking for smoking cessation; OK to refill.          Passed - Medication is active on med list        Passed - Patient is age 18 or older                 Alice Lyles RN 06/02/23 8:55 AM  "

## 2023-06-04 ENCOUNTER — HEALTH MAINTENANCE LETTER (OUTPATIENT)
Age: 33
End: 2023-06-04

## 2023-07-04 DIAGNOSIS — F33.41 MAJOR DEPRESSIVE DISORDER, RECURRENT EPISODE, IN PARTIAL REMISSION (H): ICD-10-CM

## 2023-07-05 RX ORDER — ESCITALOPRAM OXALATE 20 MG/1
TABLET ORAL
Qty: 30 TABLET | Refills: 0 | Status: SHIPPED | OUTPATIENT
Start: 2023-07-05 | End: 2023-07-14

## 2023-07-05 NOTE — TELEPHONE ENCOUNTER
"Routing refill request to provider for review/approval because:  PHQ9 due        Requested Prescriptions   Pending Prescriptions Disp Refills     escitalopram (LEXAPRO) 20 MG tablet [Pharmacy Med Name: ESCITALOPRAM 20 MG TABLET] 30 tablet 0     Sig: TAKE 1 TABLET BY MOUTH EVERY DAY       SSRIs Protocol Failed - 7/4/2023 10:06 AM        Failed - PHQ-9 score less than 5 in past 6 months     Please review last PHQ-9 score.           Passed - Medication is active on med list        Passed - Patient is age 18 or older        Passed - Recent (6 mo) or future (30 days) visit within the authorizing provider's specialty     Patient had office visit in the last 6 months or has a visit in the next 30 days with authorizing provider or within the authorizing provider's specialty.  See \"Patient Info\" tab in inbasket, or \"Choose Columns\" in Meds & Orders section of the refill encounter.                     Kaleb Negrete RN 07/05/23 9:32 AM  "

## 2023-07-13 ASSESSMENT — PATIENT HEALTH QUESTIONNAIRE - PHQ9
SUM OF ALL RESPONSES TO PHQ QUESTIONS 1-9: 6
10. IF YOU CHECKED OFF ANY PROBLEMS, HOW DIFFICULT HAVE THESE PROBLEMS MADE IT FOR YOU TO DO YOUR WORK, TAKE CARE OF THINGS AT HOME, OR GET ALONG WITH OTHER PEOPLE: SOMEWHAT DIFFICULT
SUM OF ALL RESPONSES TO PHQ QUESTIONS 1-9: 6

## 2023-07-14 ENCOUNTER — OFFICE VISIT (OUTPATIENT)
Dept: FAMILY MEDICINE | Facility: CLINIC | Age: 33
End: 2023-07-14
Payer: COMMERCIAL

## 2023-07-14 VITALS
HEIGHT: 71 IN | RESPIRATION RATE: 18 BRPM | DIASTOLIC BLOOD PRESSURE: 70 MMHG | HEART RATE: 63 BPM | WEIGHT: 211.5 LBS | TEMPERATURE: 98.7 F | BODY MASS INDEX: 29.61 KG/M2 | SYSTOLIC BLOOD PRESSURE: 118 MMHG | OXYGEN SATURATION: 97 %

## 2023-07-14 DIAGNOSIS — R06.83 SNORING: ICD-10-CM

## 2023-07-14 DIAGNOSIS — F41.1 GAD (GENERALIZED ANXIETY DISORDER): Primary | ICD-10-CM

## 2023-07-14 DIAGNOSIS — F33.41 MAJOR DEPRESSIVE DISORDER, RECURRENT EPISODE, IN PARTIAL REMISSION (H): ICD-10-CM

## 2023-07-14 DIAGNOSIS — R06.81 APNEA: ICD-10-CM

## 2023-07-14 DIAGNOSIS — H90.8 MIXED CONDUCTIVE AND SENSORINEURAL HEARING LOSS, UNSPECIFIED LATERALITY: ICD-10-CM

## 2023-07-14 PROCEDURE — 99213 OFFICE O/P EST LOW 20 MIN: CPT | Performed by: FAMILY MEDICINE

## 2023-07-14 RX ORDER — ESCITALOPRAM OXALATE 20 MG/1
20 TABLET ORAL DAILY
Qty: 90 TABLET | Refills: 3 | Status: SHIPPED | OUTPATIENT
Start: 2023-07-14 | End: 2024-06-03

## 2023-07-14 ASSESSMENT — PATIENT HEALTH QUESTIONNAIRE - PHQ9
5. POOR APPETITE OR OVEREATING: SEVERAL DAYS
SUM OF ALL RESPONSES TO PHQ QUESTIONS 1-9: 6
10. IF YOU CHECKED OFF ANY PROBLEMS, HOW DIFFICULT HAVE THESE PROBLEMS MADE IT FOR YOU TO DO YOUR WORK, TAKE CARE OF THINGS AT HOME, OR GET ALONG WITH OTHER PEOPLE: SOMEWHAT DIFFICULT

## 2023-07-14 ASSESSMENT — ANXIETY QUESTIONNAIRES
GAD7 TOTAL SCORE: 7
3. WORRYING TOO MUCH ABOUT DIFFERENT THINGS: MORE THAN HALF THE DAYS
1. FEELING NERVOUS, ANXIOUS, OR ON EDGE: MORE THAN HALF THE DAYS
GAD7 TOTAL SCORE: 7
2. NOT BEING ABLE TO STOP OR CONTROL WORRYING: SEVERAL DAYS
5. BEING SO RESTLESS THAT IT IS HARD TO SIT STILL: NOT AT ALL
7. FEELING AFRAID AS IF SOMETHING AWFUL MIGHT HAPPEN: SEVERAL DAYS
6. BECOMING EASILY ANNOYED OR IRRITABLE: NOT AT ALL
IF YOU CHECKED OFF ANY PROBLEMS ON THIS QUESTIONNAIRE, HOW DIFFICULT HAVE THESE PROBLEMS MADE IT FOR YOU TO DO YOUR WORK, TAKE CARE OF THINGS AT HOME, OR GET ALONG WITH OTHER PEOPLE: SOMEWHAT DIFFICULT

## 2023-07-14 ASSESSMENT — PAIN SCALES - GENERAL: PAINLEVEL: NO PAIN (0)

## 2023-07-14 NOTE — PROGRESS NOTES
"  Assessment & Plan     (F41.1) NEETU (generalized anxiety disorder)  (primary encounter diagnosis)  (F33.41) Major depressive disorder, recurrent episode, in partial remission (H)  Comment: doing better in general since job change. Will continue on the lexapro 20mg/day. Continue with therapy. Good support in spouse.   Plan: escitalopram (LEXAPRO) 20 MG tablet            (R06.81) Apnea  Comment: discussed and referral made   Plan: Adult Sleep Eval & Management          Referral            (R06.83) Snoring  Comment: discussed and referral made   Plan: Adult Sleep Eval & Management          Referral            (H90.8) Mixed conductive and sensorineural hearing loss, unspecified laterality  Comment: discussed and referral made   Plan: Adult Audiology  Referral             BMI:   Estimated body mass index is 29.71 kg/m  as calculated from the following:    Height as of this encounter: 1.797 m (5' 10.75\").    Weight as of this encounter: 95.9 kg (211 lb 8 oz).       Regular exercise    Michelle Flores MD  [facility] [place] [facility] HIEN Cristina is a 33 year old, presenting for the following health issues:  Recheck Medication, Hearing Problem, and Sleep Apnea        7/14/2023     3:49 PM   Additional Questions   Roomed by Anastasia HOLDER CMA   Accompanied by Self     Hearing Problem    History of Present Illness       Reason for visit:  Hearing loss and sleep apnea  Symptom onset:  More than a month  Symptoms include:  Very difficult time hearing. Light sleeping and snoring as reported by my .  Symptom intensity:  Moderate  Symptom progression:  Staying the same  Had these symptoms before:  Yes  Has tried/received treatment for these symptoms:  No    Ibeth Thapa eats 2-3 servings of fruits and vegetables daily.Ibeth Thapa consumes 1 sweetened beverage(s) daily.Ibeth Thapa exercises with enough effort to increase Ibeth Thapa's heart rate 9 or less minutes per day.  " "Ibeth Thapa exercises with enough effort to increase Ibeth Thapa's heart rate 3 or less days per week.   Ibeth Thapa is taking medications regularly.    Today's PHQ-9         PHQ-9 Total Score: 6    PHQ-9 Q9 Thoughts of better off dead/self-harm past 2 weeks :   Not at all    How difficult have these problems made it for you to do your work, take care of things at home, or get along with other people: Somewhat difficult         10/4/2022     3:09 PM 1/12/2023    12:12 PM 7/14/2023     4:00 PM   NEETU-7 SCORE   Total Score  11 (moderate anxiety)    Total Score 17 11 7       New job was very stressful and he felt that meds weren't working well  wellbutrin - didn't like the way it felt - was feeling \"medicated\" on that so stopped taking it - emotionally numb   Feeling more present   Having some more emotions    Has a therapist, doing CBT and it is helpful     Has been on the lexapro for awhile   Has been on meds since 21yo     [employer]      Hearing impairment since childhood  Lip reading especially with background     Apnea reported by bed partner      Review of Systems   Constitutional, HEENT, cardiovascular, pulmonary, gi and gu systems are negative, except as otherwise noted.      Objective    /70 (BP Location: Right arm, Patient Position: Sitting, Cuff Size: Adult Large)   Pulse 63   Temp 98.7  F (37.1  C) (Tympanic)   Resp 18   Ht 1.797 m (5' 10.75\")   Wt 95.9 kg (211 lb 8 oz)   SpO2 97%   BMI 29.71 kg/m    Body mass index is 29.71 kg/m .  Physical Exam   GENERAL - Pt is alert and oriented in no acute distress.  Affect is appropriate. Good eye contact.  PSYCH - Pt makes good eye contact, is clean and well-dressed. Oriented to person,place,and time. Cooperative. No speech abnormalities. No psychomotor agitation or retardation.  Thought process was normal and thought content was free of suicidal/homicidal ideation, obsessions, and delusions. Insight and judgement were " good.

## 2023-08-29 ENCOUNTER — TELEPHONE (OUTPATIENT)
Dept: FAMILY MEDICINE | Facility: CLINIC | Age: 33
End: 2023-08-29

## 2023-08-29 NOTE — TELEPHONE ENCOUNTER
Patient Quality Outreach    Patient is due for the following:   Depression  -  PHQ-9 needed and Depression follow-up visit    Next Steps:   Patient was scheduled for follow up 9/15/23    Type of outreach:    Chart review performed, no outreach needed.  Postpone encounter until appt         Questions for provider review:    None           Micaela Cm MA  Chart routed to Care Team.

## 2023-09-19 NOTE — TELEPHONE ENCOUNTER
Patient Quality Outreach    Patient cancelled appt he had 9/15, looks like he is rescheduled for 11/14    Next Steps:   Postpone encounter for that date    Type of outreach:    Chart review performed, no outreach needed.      Questions for provider review:    None           Micaela Cm MA  Chart routed to Care Team.

## 2023-11-21 ENCOUNTER — OFFICE VISIT (OUTPATIENT)
Dept: AUDIOLOGY | Facility: CLINIC | Age: 33
End: 2023-11-21
Attending: FAMILY MEDICINE
Payer: COMMERCIAL

## 2023-11-21 DIAGNOSIS — H90.3 SENSORINEURAL HEARING LOSS, ASYMMETRICAL: ICD-10-CM

## 2023-11-21 PROCEDURE — 92550 TYMPANOMETRY & REFLEX THRESH: CPT | Performed by: AUDIOLOGIST

## 2023-11-21 PROCEDURE — 92557 COMPREHENSIVE HEARING TEST: CPT | Performed by: AUDIOLOGIST

## 2023-11-21 NOTE — PROGRESS NOTES
AUDIOLOGY REPORT    SUBJECTIVE:  Timothy Thapa is a 33 year old adult who was seen in the Audiology Clinic at the [facility] for audiologic evaluation, referred by Michelle Flores M.D. The patient reports a perceive decline in hearing. The patient denies  bilateral tinnitus, bilateral otalgia, bilateral drainage, bilateral aural fullness, family history of hearing loss, and history of noise exposure.  The patient notes difficulty with communication in a variety of listening situations.  {They were unaccompanied today.    OBJECTIVE:  Abuse Screening:  Do you feel unsafe at home or work/school? No  Do you feel threatened by someone? No  Does anyone try to keep you from having contact with others, or doing things outside of your home? No  Physical signs of abuse present? No     Fall Risk Screen:  1. Have you fallen two or more times in the past year? No  2. Have you fallen and had an injury in the past year? No    Timed Up and Go Score (in seconds): not tested  Is patient a fall risk? No  Referral initiated: No  Fall Risk Assessment Completed by Audiology    Otoscopic exam indicates ears are clear of cerumen bilaterally     Pure Tone Thresholds assessed using conventional audiometry with good  reliability from 250-8000 Hz bilaterally using insert earphones and circumaural headphones     RIGHT:  normal and borderline-normal from 250-1500 Hz sloping to mild and moderate sensorineural hearing loss    LEFT:     normal and borderline-normal from 250-2000 Hz sloping to mild and moderate sensorineural hearing loss    Tympanogram:    RIGHT: normal eardrum mobility    LEFT:   normal eardrum mobility    Reflexes (reported by stimulus ear):  RIGHT: Ipsilateral is absent at frequencies tested  RIGHT: Contralateral is absent at frequencies tested  LEFT:   Ipsilateral is absent at frequencies tested  LEFT:   Contralateral is absent at frequencies tested      Speech Reception Threshold:    RIGHT: 25 dB  HL    LEFT:   25 dB HL    Speech Reception Thresholds are in good agreement with pure tone thresholds.    Word Recognition Score:     RIGHT: 100% at 65 dB HL using NU-6 recorded word list.    LEFT:   88% at 70 dB HL using NU-6 recorded word list.      ASSESSMENT:     ICD-10-CM    1. Sensorineural hearing loss, asymmetrical  H90.3 Adult Audiology  Referral          Today s results were discussed with the patient in detail.     PLAN:  Patient was counseled regarding hearing loss and impact on communication.  Patient is a good candidate for amplification at this time.  Handout on good communication strategies, and hearing aid use was given to patient. It is recommended that the patient see ENT for medical clearance for hearing aid use and also schedule a hearing aid consultation with audiology.  Please call this clinic with questions regarding these results or recommendations.          Michael Agustin MA, CCC-A  [place] Licensed Audiologist #[id number]  11/21/2023

## 2024-01-02 ENCOUNTER — OFFICE VISIT (OUTPATIENT)
Dept: AUDIOLOGY | Facility: CLINIC | Age: 34
End: 2024-01-02
Payer: COMMERCIAL

## 2024-01-02 ENCOUNTER — OFFICE VISIT (OUTPATIENT)
Dept: URGENT CARE | Facility: URGENT CARE | Age: 34
End: 2024-01-02
Payer: COMMERCIAL

## 2024-01-02 VITALS
BODY MASS INDEX: 31.1 KG/M2 | SYSTOLIC BLOOD PRESSURE: 140 MMHG | OXYGEN SATURATION: 98 % | RESPIRATION RATE: 18 BRPM | TEMPERATURE: 99.2 F | DIASTOLIC BLOOD PRESSURE: 84 MMHG | HEART RATE: 74 BPM | WEIGHT: 221.4 LBS

## 2024-01-02 DIAGNOSIS — R09.81 NASAL CONGESTION: ICD-10-CM

## 2024-01-02 DIAGNOSIS — M62.830 SPASM OF THORACIC BACK MUSCLE: ICD-10-CM

## 2024-01-02 DIAGNOSIS — H90.3 SENSORINEURAL HEARING LOSS, ASYMMETRICAL: Primary | ICD-10-CM

## 2024-01-02 DIAGNOSIS — H66.92 LEFT ACUTE OTITIS MEDIA: Primary | ICD-10-CM

## 2024-01-02 PROCEDURE — 92591 PR HEARING AID EXAM BINAURAL: CPT | Performed by: AUDIOLOGIST

## 2024-01-02 PROCEDURE — 99213 OFFICE O/P EST LOW 20 MIN: CPT | Performed by: STUDENT IN AN ORGANIZED HEALTH CARE EDUCATION/TRAINING PROGRAM

## 2024-01-02 RX ORDER — FLUTICASONE PROPIONATE 50 MCG
1 SPRAY, SUSPENSION (ML) NASAL DAILY
Qty: 11.1 ML | Refills: 0 | Status: SHIPPED | OUTPATIENT
Start: 2024-01-02 | End: 2024-02-19

## 2024-01-02 RX ORDER — CYCLOBENZAPRINE HCL 10 MG
10 TABLET ORAL 3 TIMES DAILY PRN
Qty: 30 TABLET | Refills: 0 | Status: SHIPPED | OUTPATIENT
Start: 2024-01-02 | End: 2024-02-19

## 2024-01-02 RX ORDER — CEFDINIR 300 MG/1
300 CAPSULE ORAL 2 TIMES DAILY
Qty: 14 CAPSULE | Refills: 0 | Status: SHIPPED | OUTPATIENT
Start: 2024-01-02 | End: 2024-01-09

## 2024-01-02 ASSESSMENT — PAIN SCALES - GENERAL: PAINLEVEL: SEVERE PAIN (6)

## 2024-01-02 NOTE — PROGRESS NOTES
ASSESSMENT & PLAN:   Diagnoses and all orders for this visit:  Left acute otitis media  -     cefdinir (OMNICEF) 300 MG capsule; Take 1 capsule (300 mg) by mouth 2 times daily for 7 days  Nasal congestion  -     fluticasone (FLONASE) 50 MCG/ACT nasal spray; Spray 1 spray into both nostrils daily  Spasm of thoracic back muscle  -     cyclobenzaprine (FLEXERIL) 10 MG tablet; Take 1 tablet (10 mg) by mouth 3 times daily as needed for muscle spasms    URI symptoms x 2 days. Has left TM erythema and bulging - cefdinir x 7 days (PCN allergy, has tolerated cefdinir in past). Flonase for congestion/sinus pressure. Diffuse back pain secondary to coughing and sleeping upright during illness. Has right upper trapezius tenderness and tightness on exam. Will treat with Flexeril as needed, tylenol/ibuprofen, heat.     At the end of the encounter, I discussed results, diagnosis, medications. Discussed red flags for immediate return to clinic/ER, as well as indications for follow up if no improvement. Patient and/or caregiver understood and agreed to plan. Patient was stable for discharge.    There are no Patient Instructions on file for this visit.    ------------------------------------------------------------------------  SUBJECTIVE  History was obtained from patient.    Patient presents with:  Ear Problem: Bilateral ear pain since Sunday.   Cough: Upper back pain from coughing.    HPI  Timothy Thapa is a(n) 33 year old adult presenting to urgent care for URI symptoms x 2 days. Has congestion, bilateral ear pain, slight cough. Mild sore throat - unsure if from cough or drainage. Cough is nonproductive. Also has back pain from coughing and from sleeping upright with this illness. Taking Sudafed with no relief. No known sick contacts. No history of asthma.     Review of Systems    Current Outpatient Medications   Medication Sig Dispense Refill    cefdinir (OMNICEF) 300 MG capsule Take 1 capsule (300 mg) by mouth 2 times daily  for 7 days 14 capsule 0    cyclobenzaprine (FLEXERIL) 10 MG tablet Take 1 tablet (10 mg) by mouth 3 times daily as needed for muscle spasms 30 tablet 0    escitalopram (LEXAPRO) 20 MG tablet Take 1 tablet (20 mg) by mouth daily 90 tablet 3    fluticasone (FLONASE) 50 MCG/ACT nasal spray Spray 1 spray into both nostrils daily 11.1 mL 0     Problem List:  2023-07: Snoring  2023-07: Apnea  2023-07: Mixed conductive and sensorineural hearing loss, unspecified   laterality  2022-03: 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   PSYCH: mentation appears normal. Normal affect  HEAD: normocephalic, atraumatic.  EYE: PERRL. EOMs intact. No scleral injection bilaterally.   EAR: external ear normal. Bilateral ear canals normal and nonpainful. Left TM bulging and erythematous Right TM slightly pink, no bulging.  NOSE: external nose atraumatic without lesions.  OROPHARYNX: moist mucous membranes. Posterior oropharynx with mild erythema. No exudate. Uvula midline. Patent airway.  LUNGS: no increased work of breathing. Clear lung sounds bilaterally. No wheezing, rhonchi, or rales.   CV: regular rate and rhythm. No clicks, murmurs, or rubs.  MSK: no tenderness of cervical, thoracic, or lumbar spinous process. Tenderness and tightness of right upper trapezius.       No results found for any visits on 01/02/24.

## 2024-01-02 NOTE — PROGRESS NOTES
"AUDIOLOGY REPORT    SUBJECTIVE: Timothy \"Ibeth\" LINDA Thapa is a 33 year old adult who was seen in the Audiology Clinic at  [facility] on 1/02/24 to discuss concerns with hearing and functional communication difficulties. The patient was not accompanied by anyone. Ibeth has been seen previously on 11/21/23, and results revealed a normal hearing sloping to moderately severe rising to moderate sensorineural hearing loss in the right ear and normal hearing sloping to moderately severe rising to mild sensorineural hearing loss. Ibeth notes difficulty with communication in a variety of listening situations, especially at work and in background noise.     OBJECTIVE: Patient is a hearing aid candidate. Patient would like to move forward with a hearing aid evaluation today. Therefore, the patient was presented with different options for amplification to help aid in communication. Discussed styles, levels of technology and monaural vs. binaural fitting. Patient is interested in a rechargeable battery and can connect to iPhone.     The hearing aid(s) mutually chosen were:  Binaural: ReSound Nexia 5 or 7   COLOR: Black   BATTERY SIZE: rechargeable  EARMOLD/TIPS: medium open   CANAL/ LENGTH: 1-LP  ACCESSORY: Remote [place]     ASSESSMENT: Reviewed purchase information and warranty information with patient. The 45 day trial period was explained to patient. The patient was given a copy of the [place] [employer] consumer brochure on purchasing hearing instruments. Patient risk factors have been provided to the patient in writing prior to the sale of the hearing aid per FDA regulation. The risk factors are also available in the User Instructional Booklet to be presented on the day of the hearing aid fitting. Hearing aid(s) ordered. Hearing aid evaluation completed.    PLAN: Ibeth is not scheduled to return for a hearing aid fitting and programming, but will schedule on " the way out. Patient will contact clinic regarding technology choice by the end of the week. Purchase agreement will be completed on that date. Please contact this clinic with any questions or concerns.    Sasha Stevenson. CCC-A  Licensed Audiologist   [place] #[id number]

## 2024-01-31 ENCOUNTER — OFFICE VISIT (OUTPATIENT)
Dept: AUDIOLOGY | Facility: CLINIC | Age: 34
End: 2024-01-31
Payer: COMMERCIAL

## 2024-01-31 DIAGNOSIS — H90.3 SENSORINEURAL HEARING LOSS, ASYMMETRICAL: Primary | ICD-10-CM

## 2024-01-31 PROCEDURE — V5020 CONFORMITY EVALUATION: HCPCS | Performed by: AUDIOLOGIST

## 2024-01-31 PROCEDURE — V5261 HEARING AID, DIGIT, BIN, BTE: HCPCS | Performed by: AUDIOLOGIST

## 2024-01-31 PROCEDURE — V5160 DISPENSING FEE BINAURAL: HCPCS | Performed by: AUDIOLOGIST

## 2024-01-31 PROCEDURE — V5011 HEARING AID FITTING/CHECKING: HCPCS | Performed by: AUDIOLOGIST

## 2024-01-31 NOTE — PROGRESS NOTES
AUDIOLOGY REPORT  SUBJECTIVE: Timothy Thapa is a 33 year old adult who was seen in the Audiology Clinic at the [facility] for a fitting of bilateral ReSound Nexia 7 -in-the-canal (LALO) hearing aids. Previous results revealed a normal hearing sloping to moderately severe rising to moderate sensorineural hearing loss in the right ear and normal hearing sloping to moderately severe rising to mild sensorineural hearing loss.   OBJECTIVE: The hearing aid conformity evaluation was completed.The hearing aids were placed and they provided a good fit. Simulated real-ear-probe-microphone measurements were completed on the GROUNDBOOTH system and were a good match to NAL-NL1 target with soft sounds audible, moderate sounds comfortable, and loud sounds below discomfort. UCLs are verified through maximum power output measures and demonstrate appropriate limiting of loud inputs. Timothy was oriented to proper hearing aid use, care, cleaning (no water, dry brush), batteries (size: BATTERY SIZE: rechargeable, insertion/removal, toxicity, low-battery signal), aid insertion/removal, user booklet, warranty information, storage cases, and other hearing aid details. The patient confirmed understanding of hearing aid use and care, and showed proper insertion of hearing aid and batteries while in the office today.Timothy reported good volume and sound quality today.   Hearing aids were programmed as follows:  Program 1: All Around  Program 2: Hear in Noise  Push Button is only active for manual power on and off.    EAR(S) FIT: Bilateral  HEARING AID MODEL NAME: ReSound Nexia 7  HEARING AID STYLE: -in-the-ear behind-the-ear  EARMOLDS/TIP: Medium Open  SERIAL NUMBERS: Right: [id number] Left: [id number]  WARRANTY END DATE: 02/08/2028  *Extended warranty was selected by accident instead of remote microphone. Remote samuel will be ordered and warranty adjusted to 3 years.  The hearing aids  were successfully connected to the patient's iPhone and to the ReSound Smart 3D ingrid. They were shown how to utilize the bluetooth to stream audio and phone calls and the ingrid was demonstrated. The patient was given additional resources regarding connectivity and bluetooth support to reference if needed.  ASSESSMENT: Bilateral ReSound Nexia 7 hearing aid(s) were fit today. Verification measures were performed. Timothy signed the Hearing Aid Purchase Agreement and was given a copy, as well as details on Ibeth Thapa's hearing aids. Patient was counseled that exact out of pocket amounts cannot be determined for hearing aid claims being sent to insurance. Any insurance coverage information presented to the patient is an estimate only, and is not a guarantee of payment. Patient has been advised to check with their own insurance.    PLAN:Timothy will return for follow-up in 2-3 weeks for a hearing aid review appointment. At this time, remote samuel will be reviewed. Please call this clinic with questions regarding today s appointment.  Magnolia Sultana M.A.  Audiology Doctoral Extern  [place] #[id number]     I was present with the patient for the entire audiology appointment including all procedures/testing performed by the AuD student, and agree with the student's assessment and plan as documented.     Sasha Stevenson. CCC-A  Licensed Audiologist   [place] #[id number]

## 2024-02-19 ENCOUNTER — OFFICE VISIT (OUTPATIENT)
Dept: SLEEP MEDICINE | Facility: CLINIC | Age: 34
End: 2024-02-19
Attending: FAMILY MEDICINE
Payer: COMMERCIAL

## 2024-02-19 VITALS
WEIGHT: 225.4 LBS | RESPIRATION RATE: 16 BRPM | HEART RATE: 61 BPM | DIASTOLIC BLOOD PRESSURE: 75 MMHG | HEIGHT: 72 IN | BODY MASS INDEX: 30.53 KG/M2 | OXYGEN SATURATION: 97 % | SYSTOLIC BLOOD PRESSURE: 118 MMHG

## 2024-02-19 DIAGNOSIS — R29.818 SUSPECTED SLEEP APNEA: Primary | ICD-10-CM

## 2024-02-19 DIAGNOSIS — G47.00 INSOMNIA, UNSPECIFIED TYPE: ICD-10-CM

## 2024-02-19 DIAGNOSIS — R06.83 SNORING: ICD-10-CM

## 2024-02-19 DIAGNOSIS — E66.811 OBESITY (BMI 30.0-34.9): ICD-10-CM

## 2024-02-19 DIAGNOSIS — R06.81 WITNESSED APNEIC SPELLS: ICD-10-CM

## 2024-02-19 PROCEDURE — 99203 OFFICE O/P NEW LOW 30 MIN: CPT | Performed by: NURSE PRACTITIONER

## 2024-02-19 ASSESSMENT — SLEEP AND FATIGUE QUESTIONNAIRES
HOW LIKELY ARE YOU TO NOD OFF OR FALL ASLEEP WHEN YOU ARE A PASSENGER IN A CAR FOR AN HOUR WITHOUT A BREAK: WOULD NEVER DOZE
HOW LIKELY ARE YOU TO NOD OFF OR FALL ASLEEP WHILE SITTING AND READING: SLIGHT CHANCE OF DOZING
HOW LIKELY ARE YOU TO NOD OFF OR FALL ASLEEP WHILE SITTING QUIETLY AFTER LUNCH WITHOUT ALCOHOL: WOULD NEVER DOZE
HOW LIKELY ARE YOU TO NOD OFF OR FALL ASLEEP WHILE LYING DOWN TO REST IN THE AFTERNOON WHEN CIRCUMSTANCES PERMIT: SLIGHT CHANCE OF DOZING
HOW LIKELY ARE YOU TO NOD OFF OR FALL ASLEEP WHILE SITTING AND TALKING TO SOMEONE: WOULD NEVER DOZE
HOW LIKELY ARE YOU TO NOD OFF OR FALL ASLEEP WHILE WATCHING TV: SLIGHT CHANCE OF DOZING
HOW LIKELY ARE YOU TO NOD OFF OR FALL ASLEEP WHILE SITTING INACTIVE IN A PUBLIC PLACE: WOULD NEVER DOZE
HOW LIKELY ARE YOU TO NOD OFF OR FALL ASLEEP IN A CAR, WHILE STOPPED FOR A FEW MINUTES IN TRAFFIC: WOULD NEVER DOZE

## 2024-02-19 NOTE — PROGRESS NOTES
Outpatient Sleep Medicine Consultation:      Name: Timothy Thapa MRN# [id number]   Age: 33 year old YOB: 1990     Date of Consultation: February 19, 2024  Consultation is requested by: Michelle Flores MD  [id number] [id number] Michelle Flores  Primary care provider: Clinic - [facility]       Reason for Sleep Consult:     Timothy Thapa is sent by Michelle Flores for a sleep consultation regarding Snoring, witnessed apnea, possible MIKE.    Patient s Reason for visit  Timothy Thapa main reason for visit: poor sleep and snoring/apnea reported by partner  Patient states problem(s) started: 2019  Timothy Thapa's goals for this visit: start process of at home sleep testing           Assessment and Plan:     Summary Sleep Diagnoses:  1. Suspected sleep apnea  2. Snoring  3. Witnessed apneic spells  4. Insomnia, unspecified type  5. Obesity (BMI 30.0-34.9)  - Adult Sleep Eval & Management  Referral  - HST-Home Sleep Apnea Test - Noxturnal Returnable; Future      Comorbid Diagnoses:  1.  Major depression  2.  Anxiety  3.  Mixed conductive and sensorineural hearing loss      Summary Recommendations:  1.  Recommend further evaluation with home sleep apnea testing (HST) for possible sleep disordered breathing.  They have a high pretest probability of MIKE with symptoms of snoring, snorting self awake, witnessed apneas, difficulty falling/staying asleep, problems getting back to sleep once awakened, and poorly restful sleep for the last 5 years.  Their STOP-BANG score is 4 today which suggest a high risk of MIKE using the enhanced version of the questionnaire.  Their symptoms are consistent with possible sleep disordered breathing.  2.  We discussed the pathophysiology of obstructive sleep apnea and the risks associated with untreated MIKE.  We also discussed the pros and cons of in lab polysomnography versus home sleep testing.  The patient has elected to undergo  home sleep testing (HST) to further evaluate their symptoms of possible obstructive sleep apnea.  3.  All potential therapeutic options including positive airway pressure, mandibular advancing oral appliances, positional therapy, and surgical options were discussed. Also counseled about impact of weight loss on MIKE.   4.  Their insomnia severity index is 18 today consistent with moderate severity clinical insomnia.  Their symptoms are multifactorial and include an active mind at bedtime, anxiety/depression, occasional RLS, and possible sleep disordered breathing contributing.  Information provided in AVS regarding sleep hygiene guidelines for their review.  5.  Follow-up in approximately 2 weeks after the sleep study to review the results and to determine next steps.    Orders Placed This Encounter   Procedures    HST-Home Sleep Apnea Test - Noxturnal Returnable         Summary Counseling:    Sleep Testing Reviewed  Obstructive Sleep Apnea Reviewed  Complications of Untreated Sleep Apnea Reviewed  Previous recent chart notes reviewed    Medical Decision-making:   Educational materials provided in instructions    Total time spent reviewing medical records, history and physical examination, review of previous testing and interpretation as well as documentation on this date: 38 minutes    CC: Michelle Flores          History of Present Illness:     Timothy Thapa is a 33-year-old adult with a PMH pertinent for major depression, anxiety, mixed conductive and sensorineural hearing loss who presents today with symptoms of snoring, witnessed apneas, snorting self awake, difficulty falling/staying asleep, problems getting back to sleep once awakened, teeth grinding, RLS, and poorly restful sleep over the last 5 years.  They were referred by their PCP for further evaluation of possible sleep disordered breathing.    Past Sleep Evaluations: No    SLEEP-WAKE SCHEDULE:     Work/School Days: Patient goes to school/work: Yes,  bank - day hours   Usually gets into bed at 9pm  Takes patient about 1 hour to fall asleep  Has trouble falling asleep 4 nights per week  Wakes up in the middle of the night 2-3 times.  Wakes up due to Snorting self awake;Use the bathroom;Uncertain  Ibeth Thapa has trouble falling back asleep 4 times a week.   It usually takes 45 minutes to get back to sleep  Patient is usually up at 645am  Uses alarm: Yes    Weekends/Non-work Days/All Other Days:  Usually gets into bed at 9pm   Takes patient about 1 hour to fall asleep  Patient is usually up at 7am  Uses alarm: No    Sleep Need  Patient gets  8h sleep on average   Patient thinks Ibeth Thapa needs about 9h sleep    Timothy Thapa prefers to sleep in this position(s): Back;Side;Head Elevated   Patient states they do the following activities in bed: Use phone, computer, or tablet    Naps  Patient takes a purposeful nap 2 times a week and naps are usually 2h in duration  Ibeth Thapa feels better after a nap: No  Ibeth Thapa dozes off unintentionally 0 days per week  Patient has had a driving accident or near-miss due to sleepiness/drowsiness: No      SLEEP DISRUPTIONS:    Breathing/Snoring  Patient snores:Yes  Other people complain about Ibeth Thapa's snoring: Yes  Patient has been told Ibeth Thapa stops breathing in Ibeth Thapa's sleep:Yes  Ibeth Thapa has issues with the following: Morning mouth dryness;Stuffy nose when you wake up    Movement:  Patient gets pain, discomfort, with an urge to move:  Yes  It happens when Ibeth Thapa is resting:  Yes  It happens more at night:  Yes  Patient has been told Ibeth Thapa kicks Ibeth Thapa's legs at night:  No     Behaviors in Sleep:  Timothy Thapa has experienced the following behaviors while sleeping: Recurring Nightmares;Teeth grinding  Ibeth Thapa has experienced sudden muscle weakness during the day: No      Is there anything else you would like your sleep provider to  know: N/A      CAFFEINE AND OTHER SUBSTANCES:    Patient consumes caffeinated beverages per day:  1  Last caffeine use is usually: 7a  List of any prescribed or over the counter stimulants that patient takes: N/A  List of any prescribed or over the counter sleep medication patient takes: N/A  List of previous sleep medications that patient has tried: melatonin  Patient drinks alcohol to help them sleep: No  Patient drinks alcohol near bedtime: Yes    Alcohol use: 2 drinks - 4 nights/week  Nicotine/tobacco use: None  Recreational drug use: Yes - marijuana 6-7 days/week      Family History:  Patient has a family member been diagnosed with a sleep disorder: No            SCALES:    EPWORTH SLEEPINESS SCALE         2/19/2024     8:34 AM    Elverta Sleepiness Scale ( DOUGLAS Espinoza  [id number]<br>ESS - [place]/English - Final version - 21 Nov 07 - [facility] [place].)   Sitting and reading Slight chance of dozing   Watching TV Slight chance of dozing   Sitting, inactive in a public place (e.g. a theatre or a meeting) Would never doze   As a passenger in a car for an hour without a break Would never doze   Lying down to rest in the afternoon when circumstances permit Slight chance of dozing   Sitting and talking to someone Would never doze   Sitting quietly after a lunch without alcohol Would never doze   In a car, while stopped for a few minutes in traffic Would never doze   Elverta Score (MC) 3   Elverta Score (Sleep) 3         INSOMNIA SEVERITY INDEX (GIBSON)          2/19/2024     8:12 AM   Insomnia Severity Index (GIBSON)   Difficulty falling asleep 3   Difficulty staying asleep 3   Problems waking up too early 2   How SATISFIED/DISSATISFIED are you with your CURRENT sleep pattern? 3   How NOTICEABLE to others do you think your sleep problem is in terms of impairing the quality of your life? 3   How WORRIED/DISTRESSED are you about your current sleep problem? 2   To what extent do you consider your sleep problem to INTERFERE  with your daily functioning (e.g. daytime fatigue, mood, ability to function at work/daily chores, concentration, memory, mood, etc.) CURRENTLY? 2   GIBSON Total Score 18       Guidelines for Scoring/Interpretation:  Total score categories:  0-7 = No clinically significant insomnia   8-14 = Subthreshold insomnia   15-21 = Clinical insomnia (moderate severity)  22-28 = Clinical insomnia (severe)  Used via courtesy of www.Enable Healthcareth.va.gov with permission from Sheldon Winn PhD., [facility]      STOP BANG score: 4        2/19/2024     8:35 AM   STOP BANG Questionnaire (  2008, the American Society of Anesthesiologists, Inc. [employer].)   1. Snoring - Do you snore loudly (louder than talking or loud enough to be heard through closed doors)? Yes   2. Tired - Do you often feel tired, fatigued, or sleepy during daytime? Yes   3. Observed - Has anyone observed you stop breathing during your sleep? Yes   4. Blood pressure - Do you have or are you being treated for high blood pressure? No   5. BMI - BMI more than 35 kg/m2? No   6. Age - Age over 50 yr old? No   7. Neck circumference - Neck circumference greater than 40 cm? No   8. Gender - Gender male? Yes   STOP BANG Score (MC): 3 (High risk of MIKE)         GAD7        7/14/2023     4:00 PM   NEETU-7    1. Feeling nervous, anxious, or on edge 2   2. Not being able to stop or control worrying 1   3. Worrying too much about different things 2   4. Trouble relaxing 1   5. Being so restless that it is hard to sit still 0   6. Becoming easily annoyed or irritable 0   7. Feeling afraid, as if something awful might happen 1   NEETU-7 Total Score 7   If you checked any problems, how difficult have they made it for you to do your work, take care of things at home, or get along with other people? Somewhat difficult         CAGE-AID         No data to display                CAGE-AID reprinted with permission from the [place] Medical Journal, DANIELLE Chris. and  "OPAL Moura, \"Conjoint screening questionnaires for alcohol and drug abuse\" [facility] Journal 94: 135-140, 1995.      PATIENT HEALTH QUESTIONNAIRE-9 (PHQ - 9)        7/13/2023     7:35 PM   PHQ-9 ([employer])   1.  Little interest or pleasure in doing things 1   2.  Feeling down, depressed, or hopeless 1   3.  Trouble falling or staying asleep, or sleeping too much 1   4.  Feeling tired or having little energy 1   5.  Poor appetite or overeating 1   6.  Feeling bad about yourself - or that you are a failure or have let yourself or your family down 1   7.  Trouble concentrating on things, such as reading the newspaper or watching television 0   8.  Moving or speaking so slowly that other people could have noticed. Or the opposite - being so fidgety or restless that you have been moving around a lot more than usual 0   9.  Thoughts that you would be better off dead, or of hurting yourself in some way 0   PHQ-9 Total Score 6   6.  Feeling bad about yourself 1   7.  Trouble concentrating 0   8.  Moving slowly or restless 0   9.  Suicidal or self-harm thoughts 0   1.  Little interest or pleasure in doing things Several days   2.  Feeling down, depressed, or hopeless Several days   3.  Trouble falling or staying asleep, or sleeping too much Several days   4.  Feeling tired or having little energy Several days   5.  Poor appetite or overeating Several days   6.  Feeling bad about yourself Several days   7.  Trouble concentrating Not at all   8.  Moving slowly or restless Not at all   9.  Suicidal or self-harm thoughts Not at all   PHQ-9 via [facility] TOTAL SCORE-----> 6 (Mild depression)   Difficulty at work, home, or with people Somewhat difficult       Developed by Garland Clements, Christina Logan, El Abdi and colleagues, with an educational bebeto from [employer] Inc. No permission required to reproduce, translate, display or distribute.        Allergies:    Allergies   Allergen Reactions    Amoxicillin " Hives    Pcn [Penicillins]        Medications:    Current Outpatient Medications   Medication Sig Dispense Refill    escitalopram (LEXAPRO) 20 MG tablet Take 1 tablet (20 mg) by mouth daily 90 tablet 3       Problem List:  Patient Active Problem List    Diagnosis Date Noted    Snoring 07/14/2023     Priority: Medium    Apnea 07/14/2023     Priority: Medium    Mixed conductive and sensorineural hearing loss, unspecified laterality 07/14/2023     Priority: Medium    Major depressive disorder, recurrent episode, in partial remission (H24) 02/27/2018     Priority: Medium    NEETU (generalized anxiety disorder) 02/27/2018     Priority: Medium        Past Medical/Surgical History:  Past Medical History:   Diagnosis Date    Depressive disorder      No past surgical history on file.    Social History:  Social History     Socioeconomic History    Marital status:      Spouse name: Not on file    Number of children: Not on file    Years of education: Not on file    Highest education level: Not on file   Occupational History    Not on file   Tobacco Use    Smoking status: Never     Passive exposure: Never    Smokeless tobacco: Never   Vaping Use    Vaping Use: Never used   Substance and Sexual Activity    Alcohol use: Yes     Comment: 3 Beers/wk    Drug use: Yes     Types: Marijuana    Sexual activity: Yes     Partners: Male   Other Topics Concern    Parent/sibling w/ CABG, MI or angioplasty before 65F 55M? No   Social History Narrative    Not on file     Social Determinants of Health     Financial Resource Strain: Not on file   Food Insecurity: Not on file   Transportation Needs: Not on file   Physical Activity: Not on file   Stress: Not on file   Social Connections: Not on file   Interpersonal Safety: Not on file   Housing Stability: Not on file       Family History:  Family History   Problem Relation Age of Onset    Other Cancer Mother         Pancreatic Cancer    Depression Mother     Anxiety Disorder Mother     Mental  Illness Mother        Review of Systems:  A complete review of systems reviewed by me is negative with the exeption of what has been mentioned in the history of present illness.  In the last TWO WEEKS have you experienced any of the following symptoms?    Fevers: No   Night Sweats: Yes   Weight Gain: No   Pain at Night: No   Double Vision: No   Changes in Vision: No   Difficulty Breathing through Nose: No   In the last TWO WEEKS have you experienced any of the following symptoms?    Sore Throat in Morning: No   Dry Mouth in the Morning: Yes   Shortness of Breath Lying Flat: No   Shortness of Breath With Activity: Yes   Awakening with Shortness of Breath: No   Increased Cough: No   In the last TWO WEEKS have you experienced any of the following symptoms?    Heart Racing at Night: No   Swelling in Feet or Legs: No   Diarrhea at Night: Yes   Heartburn at Night: No   Urinating More than Once at Night: No   In the last TWO WEEKS have you experienced any of the following symptoms?    Losing Control of Urine at Night: No   Joint Pains at Night: No   Headaches in Morning: No   Weakness in Arms or Legs: No   Depressed Mood: Yes   Anxiety: Yes       Physical Examination:  Vitals: /75   Pulse 61   Resp 16   Ht 1.829 m (6')   Wt 102.2 kg (225 lb 6.4 oz)   SpO2 97%   BMI 30.57 kg/m    BMI= Body mass index is 30.57 kg/m .    Neck Cir (cm): 40 cm      GENERAL APPEARANCE: healthy, alert, no distress, and cooperative  EYES: Eyes grossly normal to inspection, PERRL, conjunctivae and sclerae normal, lids and lashes normal, and wearing eyeglasses  HENT: nose and mouth without ulcers or lesions, oropharynx crowded, uvula elongated, tongue base enlarged, oral mucous membranes moist, and normal cephalic/atraumatic  NECK: no adenopathy, no asymmetry, masses, or scars, thyroid normal to palpation, and trachea midline and normal to palpation  RESP: lungs clear to auscultation - no rales, rhonchi or wheezes  CV: regular rates and  "rhythm, normal S1 S2, no S3 or S4, and no murmur, click or rub  ABDOMEN: bowel sounds normal, obese, and soft, non-tender  MS: extremities normal- no gross deformities noted  NEURO: Alert and oriented x 3, normal strength and tone, mentation intact, and speech normal  PSYCH: mentation appears normal and affect normal/bright  Mallampati Class: III.  Tonsillar Stage: 2  visible at pillars.         Data: All pertinent previous laboratory data reviewed     No results for input(s): \"NA\", \"POTASSIUM\", \"CHLORIDE\", \"CO2\", \"ANIONGAP\", \"GLC\", \"BUN\", \"CR\", \"JASSON\" in the last 99520 hours.    No results for input(s): \"WBC\", \"RBC\", \"HGB\", \"HCT\", \"MCV\", \"MCH\", \"MCHC\", \"RDW\", \"PLT\" in the last 95773 hours.    No results for input(s): \"PROTTOTAL\", \"ALBUMIN\", \"BILITOTAL\", \"ALKPHOS\", \"AST\", \"ALT\", \"BILIDIRECT\" in the last 10483 hours.    No results found for: \"TSH\"    No results found for: \"UAMP\", \"UBARB\", \"BENZODIAZEUR\", \"UCANN\", \"UCOC\", \"OPIT\", \"UPCP\"    No results found for: \"IRONSAT\", \"GT07840\", \"LUIS F\"    No results found for: \"PH\", \"PHARTERIAL\", \"PO2\", \"SZ7ELHDHPDH\", \"SAT\", \"PCO2\", \"HCO3\", \"BASEEXCESS\", \"HEMAL\", \"BEB\"    @LABRCNTIPR(phv:4,pco2v:4,po2v:4,hco3v:4,angus:4,o2per:4)@    Echocardiology: No results found for this or any previous visit (from the past 4320 hour(s)).    Chest x-ray: No results found for this or any previous visit from the past 365 days.      Chest CT: No results found for this or any previous visit from the past 365 days.      PFT: Most Recent [facility]ze Pulmonary Function Testing    No results found for: \"20001\"  No results found for: \"20002\"  No results found for: \"20003\"  No results found for: \"20015\"  No results found for: \"20016\"  No results found for: \"20027\"  No results found for: \"20028\"  No results found for: \"20029\"  No results found for: \"20079\"  No results found for: \"20080\"  No results found for: \"20081\"  No results found for: \"20335\"  No results found for: \"20105\"  No results found for: \"20053\"  No " "results found for: \"20054\"  No results found for: \"20055\"      SABRINA Marcum CNP 2/19/2024   Sleep Medicine      This note was written with the assistance of the Dragon voice-dictation technology software. The final document, although reviewed, may contain errors. For corrections, please contact the office.          "

## 2024-02-19 NOTE — NURSING NOTE
Scheduled HST for 3/12/2024 with follow up 3/27/2024 with Dr. Ricci per patient schedule this appointment worked best. Printed AVS.     Le Reed   Clinic Assistant  [facility]

## 2024-02-23 ENCOUNTER — OFFICE VISIT (OUTPATIENT)
Dept: AUDIOLOGY | Facility: CLINIC | Age: 34
End: 2024-02-23
Payer: COMMERCIAL

## 2024-02-23 DIAGNOSIS — H90.3 SENSORINEURAL HEARING LOSS (SNHL) OF BOTH EARS: Primary | ICD-10-CM

## 2024-02-23 PROCEDURE — 99207 PR ASSESSMENT FOR HEARING AID: CPT | Performed by: AUDIOLOGIST

## 2024-02-23 NOTE — PROGRESS NOTES
"AUDIOLOGY REPORT    SUBJECTIVE:Timothy \"Ibeth\" LINDA Thapa is a 33 year old adult who was seen in the Audiology Clinic at the [facility] and [facility] on 2/23/2024  for a follow-up check regarding the fitting of new hearing aids. Previous results have revealed  a normal hearing sloping to moderately severe rising to moderate sensorineural hearing loss in the right ear and normal hearing sloping to moderately severe rising to mild sensorineural hearing loss. The patient has been seen previously in this clinic and was fit with bilateral ReSound Nexia 7 -in-the-canal (LALO) hearing aids on 1/31/24.  Ibeth reports that they have been very happy with the hearing aids and have noticed benefit. The patient reports that at times all of the sounds can be overwhelming, but that the settings are comfortable. 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 They note that there are still a few individuals that are hard to hear, but that it is likely due to those people mumbling. In addition, Ibeth reports it has been nice to stream, but the streaming quality is not as good as some other bluetooth devices they have used. They are wondering if this is normal.     OBJECTIVE: Based on patient report, they did not wish to make programming changes. Discussed utilizing the ingrid for things like speech booster/enhancer when certain people are more difficult. Reviewed that bluetooth quality will not be as good as something that is designed for music, such as airpods. Patient expressed understanding and just wanted to double check. They will try some setting adjustments in the ingrid while streaming as well if needed.     Patient was given remote microphone device that was ordered at the last appointment. Reviewed proper use and how to connect to the patient's phone as it was not yet charged. Also reviewed that this device can be connected to other " things/use the audio jack as patient inquired about connecting to their work phone.     Reviewed 45 day trial period, care, cleaning (no water, dry brush), batteries (size rechargeable) insertion/removal, toxicity, low-battery signal), aid insertion/removal, volume adjustment (if applicable), user booklet, warranty information, storage cases, and other hearing aid details.     No charge visit today (in warranty hearing aid check).     ASSESSMENT: A follow-up appointment for hearing aid fitting was completed today. Changes to hearing aid was completed as outlined above.     PLAN:Ibeth will return for follow-up as needed, or at least every 9-12 months for cleaning and assessment of hearing aid.  . Please call this clinic with any questions regarding today s appointment.    Magnolia Sultana M.A.  Audiology Doctoral Extern  [place] #[id number]     I was present with the patient for the entire audiology appointment including all procedures/testing performed by the AuD student, and agree with the student's assessment and plan as documented.      Sasha Stevenson. CCC-A  Licensed Audiologist   [place] #[id number]

## 2024-03-14 NOTE — LETTER
-Continue working with physical therapy, advancing with prosthetic     September 18, 2021      Timothy Thapa  [address] [facility] [address]        To Whom It May Concern:      Timothy Thapa was seen in our urgent care. Patient will not be able to do standing jobs for next 4-6 weeks at least. Will follow up for orthopedic for further evaluation.       Let us know if there are any questions.        Sincerely,        Mason Bravo MD

## 2024-04-22 ENCOUNTER — OFFICE VISIT (OUTPATIENT)
Dept: SLEEP MEDICINE | Facility: CLINIC | Age: 34
End: 2024-04-22
Payer: COMMERCIAL

## 2024-04-22 DIAGNOSIS — E66.811 OBESITY (BMI 30.0-34.9): ICD-10-CM

## 2024-04-22 DIAGNOSIS — R06.83 SNORING: ICD-10-CM

## 2024-04-22 DIAGNOSIS — R06.81 WITNESSED APNEIC SPELLS: ICD-10-CM

## 2024-04-22 DIAGNOSIS — R29.818 SUSPECTED SLEEP APNEA: ICD-10-CM

## 2024-04-22 DIAGNOSIS — G47.00 INSOMNIA, UNSPECIFIED TYPE: ICD-10-CM

## 2024-04-22 PROCEDURE — G0399 HOME SLEEP TEST/TYPE 3 PORTA: HCPCS | Performed by: INTERNAL MEDICINE

## 2024-04-22 ASSESSMENT — SLEEP AND FATIGUE QUESTIONNAIRES
HOW LIKELY ARE YOU TO NOD OFF OR FALL ASLEEP IN A CAR, WHILE STOPPED FOR A FEW MINUTES IN TRAFFIC: WOULD NEVER DOZE
HOW LIKELY ARE YOU TO NOD OFF OR FALL ASLEEP WHILE LYING DOWN TO REST IN THE AFTERNOON WHEN CIRCUMSTANCES PERMIT: SLIGHT CHANCE OF DOZING
HOW LIKELY ARE YOU TO NOD OFF OR FALL ASLEEP WHILE WATCHING TV: MODERATE CHANCE OF DOZING
HOW LIKELY ARE YOU TO NOD OFF OR FALL ASLEEP WHEN YOU ARE A PASSENGER IN A CAR FOR AN HOUR WITHOUT A BREAK: WOULD NEVER DOZE
HOW LIKELY ARE YOU TO NOD OFF OR FALL ASLEEP WHILE SITTING AND READING: SLIGHT CHANCE OF DOZING
HOW LIKELY ARE YOU TO NOD OFF OR FALL ASLEEP WHILE SITTING QUIETLY AFTER LUNCH WITHOUT ALCOHOL: WOULD NEVER DOZE
HOW LIKELY ARE YOU TO NOD OFF OR FALL ASLEEP WHILE SITTING INACTIVE IN A PUBLIC PLACE: WOULD NEVER DOZE
HOW LIKELY ARE YOU TO NOD OFF OR FALL ASLEEP WHILE SITTING AND TALKING TO SOMEONE: WOULD NEVER DOZE

## 2024-04-22 NOTE — PROGRESS NOTES
Pt is completing a home sleep test. Pt was instructed on how to put on the Noxturnal T3 device and associated equipment before going to bed and given the opportunity to practice putting it on before leaving the sleep center. Pt was reminded to bring the home sleep test kit back to the center tomorrow, at agreed upon time for download and reporting.   Neck circumference: 40 CM / 15.75 inches.  Anastasia Guillaume CMA, HST Specialist  [place] [facility]

## 2024-04-23 ENCOUNTER — DOCUMENTATION ONLY (OUTPATIENT)
Dept: SLEEP MEDICINE | Facility: CLINIC | Age: 34
End: 2024-04-23
Payer: COMMERCIAL

## 2024-04-23 NOTE — PROGRESS NOTES
This HSAT was performed using a Noxturnal T3 device which recorded snore, sound, movement activity, body position, nasal pressure, oronasal thermal airflow, pulse, oximetry and both chest and abdominal respiratory effort. HSAT data was restricted to the time patient states they were in bed.     HSAT was scored using 1B 4% hypopnea rule.     AHI: 10.6  Snoring was reported as moderate.  Time with SpO2 below 89% was 2.4 minutes.   Overall signal quality was good     Pt will follow up with sleep provider to determine appropriate therapy.     Ordering Provider, Mario Gutiérrez APRN CNP C. Oyugi, BA, RPSGT, RST System Clinical Specialist/ 4/23/2024

## 2024-04-24 NOTE — PROCEDURES
HOME SLEEP STUDY INTERPRETATION        Patient: Timothy Thapa  MRN: [id number]  YOB: 1990  Study Date: 2024  PCP/Referring Provider: Ramiro - AMANDA Miguel Weston;   Ordering Provider:   SABRINA Marcum CNP         Indications for Home Study: Timothy Thapa is a 33 year old adult with a history of depression and anxiety who presents with symptoms suggestive of obstructive sleep apnea.    Estimated body mass index is 30.57 kg/m  as calculated from the following:    Height as of 24: 1.829 m (6').    Weight as of 24: 102.2 kg (225 lb 6.4 oz).  Total score - Warren: 4 (2024  9:23 AM)  STOP-BAN/8        Data: A full night home sleep study was performed recording the standard physiologic parameters including body position, movement, sound, nasal pressure, thermal oral airflow, chest and abdominal movements with respiratory inductance plethysmography, and oxygen saturation by pulse oximetry. Pulse rate was estimated by oximetry recording. This study was considered adequate based on > 4 hours of quality oximetry and respiratory recording. As specified by the AASM Manual for the Scoring of Sleep and Associated events, version 2.3, Rule VIII.D 1B, 4% oxygen desaturation scoring for hypopneas is used as a standard of care on all home sleep apnea testing.        Analysis Time:  441 minutes        Respiration:   Sleep Associated Hypoxemia: sustained hypoxemia was not present. Baseline oxygen saturation was 93%.  Time with saturation less than or equal to 88% was 1.1 minutes. The lowest oxygen saturation was 83%.   Snoring: Snoring was present, moderate (6% time at average 66 dB.)  Respiratory events: The home study revealed a presence of 14 obstructive apneas and 20 mixed and central apneas. There were 44 hypopneas resulting in a combined apnea/hypopnea index [AHI] of 10.6 events per hour.  AHI was 23.4 per hour supine, NA per hour prone, 5.6 per hour on left side, and 4.4 per  hour on right side.   Pattern: Excluding events noted above, respiratory rate and pattern was Normal.      Position: Percent of time spent: supine - 31%, prone - 0%, on left - 29%, on right - 40%.      Heart Rate: By pulse oximetry normal rate was noted.       Assessment:   Mild obstructive sleep apnea with AHI 10.6 events per  hour.  Sleep associated hypoxemia was not present.    Recommendations:  Consider auto-CPAP at 5-15 cmH2O or oral appliance therapy.  Suggest optimizing sleep hygiene and avoiding sleep deprivation.  Weight management.        Diagnosis Code(s): Obstructive Sleep Apnea G47.33    Electronically signed by: Elma Rodríguez MD, April 24, 2024   Diplomate, American Board of Internal Medicine, Sleep Medicine

## 2024-04-24 NOTE — PROGRESS NOTES
HST POST-STUDY QUESTIONNAIRE    What time did you go to bed?  22:00  How long do you think it took to fall asleep?  30 min  What time did you wake up to start the day?   06:303  Did you get up during the night at all?  yes  If you woke up, do you remember approximately what time(s)? 05:00, 05:30  Did you have any difficulty with the equipment?  Yes I woke up once with the nose piece out and once with the finger probe off  Did you us any type of treatment with this study?  None  Was the head of the bed elevated? No  Did you sleep in a recliner?  No  Did you stop using CPAP at least 3 days before this test?  NA  Any other information you'd like us to know?

## 2024-06-03 ENCOUNTER — OFFICE VISIT (OUTPATIENT)
Dept: FAMILY MEDICINE | Facility: CLINIC | Age: 34
End: 2024-06-03
Payer: COMMERCIAL

## 2024-06-03 VITALS
BODY MASS INDEX: 29.5 KG/M2 | HEIGHT: 72 IN | TEMPERATURE: 97.3 F | DIASTOLIC BLOOD PRESSURE: 80 MMHG | OXYGEN SATURATION: 99 % | SYSTOLIC BLOOD PRESSURE: 121 MMHG | HEART RATE: 61 BPM | WEIGHT: 217.8 LBS | RESPIRATION RATE: 12 BRPM

## 2024-06-03 DIAGNOSIS — Z00.00 ENCOUNTER FOR PREVENTIVE HEALTH EXAMINATION: Primary | ICD-10-CM

## 2024-06-03 DIAGNOSIS — H90.8 MIXED CONDUCTIVE AND SENSORINEURAL HEARING LOSS, UNSPECIFIED LATERALITY: ICD-10-CM

## 2024-06-03 DIAGNOSIS — R11.2 NAUSEA AND VOMITING, UNSPECIFIED VOMITING TYPE: ICD-10-CM

## 2024-06-03 DIAGNOSIS — R06.83 SNORING: ICD-10-CM

## 2024-06-03 DIAGNOSIS — R19.7 FREQUENT DIARRHEA: ICD-10-CM

## 2024-06-03 DIAGNOSIS — Z11.59 NEED FOR HEPATITIS C SCREENING TEST: ICD-10-CM

## 2024-06-03 DIAGNOSIS — Z11.4 SCREENING FOR HIV (HUMAN IMMUNODEFICIENCY VIRUS): ICD-10-CM

## 2024-06-03 DIAGNOSIS — Z80.0 FAMILY HISTORY OF PANCREATIC CANCER: ICD-10-CM

## 2024-06-03 DIAGNOSIS — F33.41 MAJOR DEPRESSIVE DISORDER, RECURRENT EPISODE, IN PARTIAL REMISSION (H): ICD-10-CM

## 2024-06-03 PROCEDURE — 99213 OFFICE O/P EST LOW 20 MIN: CPT | Mod: 25

## 2024-06-03 PROCEDURE — 99395 PREV VISIT EST AGE 18-39: CPT

## 2024-06-03 RX ORDER — ESCITALOPRAM OXALATE 20 MG/1
20 TABLET ORAL DAILY
Qty: 90 TABLET | Refills: 3 | Status: SHIPPED | OUTPATIENT
Start: 2024-06-03 | End: 2024-07-17

## 2024-06-03 RX ORDER — ONDANSETRON 4 MG/1
4 TABLET, FILM COATED ORAL EVERY 8 HOURS PRN
Qty: 60 TABLET | Refills: 1 | Status: SHIPPED | OUTPATIENT
Start: 2024-06-03

## 2024-06-03 SDOH — HEALTH STABILITY: PHYSICAL HEALTH: ON AVERAGE, HOW MANY DAYS PER WEEK DO YOU ENGAGE IN MODERATE TO STRENUOUS EXERCISE (LIKE A BRISK WALK)?: 1 DAY

## 2024-06-03 SDOH — HEALTH STABILITY: PHYSICAL HEALTH: ON AVERAGE, HOW MANY MINUTES DO YOU ENGAGE IN EXERCISE AT THIS LEVEL?: 20 MIN

## 2024-06-03 ASSESSMENT — PATIENT HEALTH QUESTIONNAIRE - PHQ9
SUM OF ALL RESPONSES TO PHQ QUESTIONS 1-9: 9
SUM OF ALL RESPONSES TO PHQ QUESTIONS 1-9: 9
10. IF YOU CHECKED OFF ANY PROBLEMS, HOW DIFFICULT HAVE THESE PROBLEMS MADE IT FOR YOU TO DO YOUR WORK, TAKE CARE OF THINGS AT HOME, OR GET ALONG WITH OTHER PEOPLE: SOMEWHAT DIFFICULT

## 2024-06-03 ASSESSMENT — SOCIAL DETERMINANTS OF HEALTH (SDOH): HOW OFTEN DO YOU GET TOGETHER WITH FRIENDS OR RELATIVES?: ONCE A WEEK

## 2024-06-03 NOTE — COMMUNITY RESOURCES LIST (ENGLISH)
Lary 3, 2024           YOUR PERSONALIZED LIST OF SERVICES & PROGRAMS           & SHELTER    Housing      Serving People - Shelter for families  [address] [place] [address] (Distance: 3.8 miles)  Phone: [phone]  Language: English  Fee: Free      [facility] Hotline  [address] [facility] 5th [place] [address] (Distance: 4.0 miles)  Phone: [phone]  Language: English  Accessibility: Translation services      [facility]  Phone: [phone]  Website: [email or web address] [email or web address]  Language: English, Hmong, Oromo, Mongolian, Haitian  Hours: Mon 9:00 AM - 5:00 PM Tue 9:00 AM - 5:00 PM Wed 9:00 AM - 5:00 PM Thu 9:00 AM - 5:00 PM Fri 9:00 AM - 5:00 PM  Fee: Insurance  Accessibility: Blind accommodation, Deaf or hard of hearing, Translation services  Transportation Options: Free transportation    Case Management      [facility] - \[facility] [place] [facility]\"" With Services Independent  [address] [place] [address] (Distance: 1.9 miles)  Phone: [phone]  Website: https://[email or web address].PerceptiMed/contact  Language: English, Haitian  Accessibility: Ada accessible, Blind accommodation, Deaf or hard of hearing, Translation services      Living - Housing Support-[facility] (HWS-I)  [address] [place] [address] (Distance: 5.1 miles)  Phone: [phone]  Website: https://www.iDreamBooks  Language: Slovenian, English, Mongolian  Fee: Insurance      [employer] - Housing Stabilization Services  Phone: [phone]  Website: https://homebasemn.com/  Language: English  Hours: Mon 8:00 AM - 4:00 PM Tue 8:00 AM - 4:00 PM Wed 8:00 AM - 4:00 PM Thu 8:00 AM - 4:00 PM Fri 8:00 AM - 4:00 PM  Fee: Free  Accessibility: Blind accommodation, Deaf or hard of hearing  Transportation Options: Free transportation    Drop-In Services      [facility] - Drop-in center or day shelter  [address] [place] [address]  (Distance: 3.8 miles)  Phone: [phone]  Language: English  Fee: Free      [employer] - Drop-in center or day shelter  [address] [place] [address] (Distance: 4.8 miles)  Phone: [phone]  Language: English  Fee: Free  Accessibility: Ada accessible, Translation services      [facility] POSTAL SERVICE - MAIL SERVICE FOR THE HOMELESS  Phone: [phone]  Website: https://www.PictureMenu               IMPORTANT NUMBERS & WEBSITES        Emergency Services  911  .   United Way  211 http://211unitedway.org  .   Poison Control  (375) 477-1110 http://[place]poison.org http://wis[place]poison.org  .     Suicide and Crisis Lifeline  988 http://988Six3line.org  .   Childhelp [place] Child Abuse Hotline  665.273.3030 http://Childhelphotline.org   .   [place] Sexual Assault Hotline  (982) 862-2492 (HOPE) http://[employer].Corporama   .     [place] Runaway Safeline  (470) 387-2197 (RUNAWAY) http://500Shops.Corporama  .   Pregnancy & Postpartum Support  Call/text 087-034-8967  [place]: http://ppsupportmn.org  [place]: http://Secure[employer] Corporation.com/wi  .   Substance Abuse National Helpline ([facility])  669-786-HELP (3461) http://Findtreatment.gov   .                DISCLAIMER: These resources have been generated via the [employer] Platform. [employer]  does not endorse any service providers mentioned in this resource list. [employer] does not guarantee that the services mentioned in this resource list will be available to you or will improve your health or wellness.    [facility]

## 2024-06-03 NOTE — PROGRESS NOTES
"Preventive Care Visit  [facility] HealthSABRINA Dela Cruz CNP, Nurse Practitioner Primary Care  Elian 3, 2024      Assessment & Plan     Encounter for preventive health examination  Screening labs ordered as indicated below  - Comprehensive metabolic panel (BMP + Alb, Alk Phos, ALT, AST, Total. Bili, TP); Future  - Hemoglobin A1c; Future  - Lipid panel reflex to direct LDL Fasting; Future    Screening for HIV (human immunodeficiency virus)  Discussed, ordered  - HIV Antigen Antibody Combo; Future    Need for hepatitis C screening test  Discussed, ordered  - Hepatitis C Screen Reflex to HCV RNA Quant and Genotype; Future    Family history of pancreatic cancer  Mom passed away from pancreatic cancer. Referral for genetic counseling placed.   - Adult Oncology/Hematology  Referral; Future    Major depressive disorder, recurrent episode, in partial remission (H24)  Stable on lexapro, refilled  - escitalopram (LEXAPRO) 20 MG tablet; Take 1 tablet (20 mg) by mouth daily    Frequent diarrhea  Ongoing for 6 years. Colonoscopy referral placed. Discussed FODMAP diet.   - Adult GI  Referral - Procedure Only; Future    Nausea and vomiting, unspecified vomiting type  Morning nausea and vomiting has effected work for patient. Zofran ordered as needed nausea.  - ondansetron (ZOFRAN) 4 MG tablet; Take 1 tablet (4 mg) by mouth every 8 hours as needed for nausea    Mixed conductive and sensorineural hearing loss, unspecified laterality  Wears bilateral hearing aids     Snoring  Completed sleep study, mild obstructed sleep apnea with AHI 10.6 events per hour. Patient plans to follow up with sleep for further evaluation and treatment.             BMI  Estimated body mass index is 29.95 kg/m  as calculated from the following:    Height as of this encounter: 1.816 m (5' 11.5\").    Weight as of this encounter: 98.8 kg (217 lb 12.8 oz).       Counseling  Appropriate preventive services were discussed " with this patient, including applicable screening as appropriate for fall prevention, nutrition, physical activity, Tobacco-use cessation, weight loss and cognition.  Checklist reviewing preventive services available has been given to the patient.  Reviewed patient's diet, addressing concerns and/or questions.   Ibeth is at risk for lack of exercise and has been provided with information to increase physical activity for the benefit of Ibeth's well-being.   The patient was instructed to see the dentist every 6 months.   The patient reports drinking more than one alcoholic drink per day and sometimes engages in binge or excessive drinking. The patient was counseled and given information about possible harmful effects of excessive alcohol intake as well as where to get help for alcohol problems. The patient's PHQ-9 score is consistent with mild depression. Ibeth was provided with information regarding depression.     Subjective   Ibeth is a 34 year old, presenting for the following:  Physical; Nausea, Vomiting, & Diarrhea; and Establish Care        6/3/2024     4:19 PM   Additional Questions   Roomed by sunni merchant        Health Care Directive  Patient does not have a Health Care Directive or Living Will: Discussed advance care planning with patient; information given to patient to review.    HPI    Mom was 55/60 when she developed pancreatic cancer and passed away around 5 years ago. Does not have contact with dad    Diarrhea  Onset/Duration: at least 6 years if not more  Description:       Consistency of stool: watery, loose, and nausea and vomiting in the morning       Blood in stool: No       Number of loose stools past 24 hours: 1  Progression of Symptoms: intermittent and waxing and waning  Accompanying signs and symptoms:       Fever: No       Nausea/Vomiting: YES       Abdominal pain: No       Weight loss: No       Episodes of constipation: No  History   Ill contacts: No  Recent use of antibiotics: No but  worsened after abx about a year ago then did probiotics and improved somewhat  Recent travels: No  Recent medication-new or changes(Rx or OTC): No  Precipitating or alleviating factors: None  Therapies tried and outcome: tums, anti nausea OTC    Morning nausea occasional vomiting, loose or water stools, Stress makes it worst,  are also worst due to stress, Doesn't use the bathroom multiple times a day. They usually have one stool a day maybe two, diarrhea most often in the morning, 8/10 days would be diarrhea. Nausea also when waking up, no incontenance of stools. Within the last year worst  Nausea/vomitin/10 nausea, 2/10 vomiting, vomits right away in the morning before eating. Has tried eliminate foods stomach upset.     Works at a bank.       6/3/2024   General Health   How would you rate your overall physical health? (!) FAIR   Feel stress (tense, anxious, or unable to sleep) Rather much   (!) STRESS CONCERN      6/3/2024   Nutrition   Three or more servings of calcium each day? Yes   Diet: Carbohydrate counting   How many servings of fruit and vegetables per day? (!) 2-3   How many sweetened beverages each day? 0-1   Currently using Noom      6/3/2024   Exercise   Days per week of moderate/strenous exercise 1 day   Average minutes spent exercising at this level 20 min   (!) EXERCISE CONCERN:       6/3/2024   Social Factors   Frequency of gathering with friends or relatives Once a week   Worry food won't last until get money to buy more No   Food not last or not have enough money for food? No   Do you have housing?  Yes   Are you worried about losing your housing? No   Lack of transportation? No   Unable to get utilities (heat,electricity)? No   Want help with housing or utility concern? No         6/3/2024   Dental   Dentist two times every year? (!) NO   discussed      6/3/2024   TB Screening   Were you born outside of the [place]? No     Today's PHQ-9 Score:       6/3/2024     4:00 PM   PHQ-9  "SCORE   PHQ-9 Total Score MyChart 9 (Mild depression)   PHQ-9 Total Score 9   Patient has no complaints. Reports stable on lexapro.       6/3/2024   Substance Use   Alcohol more than 3/day or more than 7/wk Yes   How often do you have a drink containing alcohol 4 or more times a week   How many alcohol drinks on typical day 1 or 2   How often do you have 5+ drinks at one occasion Never   Audit 2/3 Score 0   How often not able to stop drinking once started Less than monthly   How often failed to do what normally expected Never   How often needed first drink in am after a heavy drinking session Never   How often feeling of guilt or remorse after drinking Less than monthly   How often unable to remember what happened the night before Never   Have you or someone else been injured because of your drinking No   Has anyone been concerned or suggested you cut down on drinking No   TOTAL SCORE - AUDIT 6   Do you use any other substances recreationally? (!) ALCOHOL    (!) CANNABIS PRODUCTS     Social History     Tobacco Use    Smoking status: Never     Passive exposure: Never    Smokeless tobacco: Never   Vaping Use    Vaping status: Never Used   Substance Use Topics    Alcohol use: Yes     Comment: 3 Beers/wk    Drug use: Yes     Types: Marijuana         6/3/2024   One time HIV Screening   Previous HIV test? Yes         6/3/2024   STI Screening   New sexual partner(s) since last STI/HIV test? No         6/3/2024   Contraception/Family Planning   Questions about contraception or family planning No        Reviewed and updated as needed this visit by Provider   Tobacco  Allergies  Meds  Problems  Med Hx  Surg Hx  Fam Hx                     Objective    Exam  /80 (BP Location: Right arm, Patient Position: Sitting, Cuff Size: Adult Large)   Pulse 61   Temp 97.3  F (36.3  C) (Temporal)   Resp 12   Ht 1.816 m (5' 11.5\")   Wt 98.8 kg (217 lb 12.8 oz)   SpO2 99%   BMI 29.95 kg/m     Estimated body mass index is " "29.95 kg/m  as calculated from the following:    Height as of this encounter: 1.816 m (5' 11.5\").    Weight as of this encounter: 98.8 kg (217 lb 12.8 oz).    Physical Exam  GENERAL: alert and no distress  EYES: Eyes grossly normal to inspection, PERRL and conjunctivae and sclerae normal  HENT: ear canals and TM's normal, nose and mouth without ulcers or lesions  NECK: no adenopathy, no asymmetry, masses, or scars  RESP: lungs clear to auscultation - no rales, rhonchi or wheezes  CV: regular rate and rhythm, normal S1 S2, no S3 or S4, no murmur, click or rub, no peripheral edema  ABDOMEN: soft, nontender, no hepatosplenomegaly, no masses and bowel sounds normal  MS: no gross musculoskeletal defects noted, no edema  SKIN: no suspicious lesions or rashes  NEURO: Normal strength and tone, mentation intact and speech normal  PSYCH: mentation appears normal, affect normal/bright    Signed Electronically by: SABRINA Haley CNP    Answers submitted by the patient for this visit:  Patient Health Questionnaire (Submitted on 6/3/2024)  If you checked off any problems, how difficult have these problems made it for you to do your work, take care of things at home, or get along with other people?: Somewhat difficult  PHQ9 TOTAL SCORE: 9    "

## 2024-06-19 ENCOUNTER — LAB (OUTPATIENT)
Dept: LAB | Facility: CLINIC | Age: 34
End: 2024-06-19
Payer: COMMERCIAL

## 2024-06-19 DIAGNOSIS — Z00.00 ENCOUNTER FOR PREVENTIVE HEALTH EXAMINATION: ICD-10-CM

## 2024-06-19 DIAGNOSIS — Z11.59 NEED FOR HEPATITIS C SCREENING TEST: ICD-10-CM

## 2024-06-19 DIAGNOSIS — Z11.4 SCREENING FOR HIV (HUMAN IMMUNODEFICIENCY VIRUS): ICD-10-CM

## 2024-06-19 LAB
ALBUMIN SERPL BCG-MCNC: 4.7 G/DL (ref 3.5–5.2)
ALP SERPL-CCNC: 44 U/L (ref 40–150)
ALT SERPL W P-5'-P-CCNC: 34 U/L (ref 0–70)
ANION GAP SERPL CALCULATED.3IONS-SCNC: 12 MMOL/L (ref 7–15)
AST SERPL W P-5'-P-CCNC: 40 U/L (ref 0–45)
BILIRUB SERPL-MCNC: 0.4 MG/DL
BUN SERPL-MCNC: 10.1 MG/DL (ref 6–20)
CALCIUM SERPL-MCNC: 9.9 MG/DL (ref 8.6–10)
CHLORIDE SERPL-SCNC: 100 MMOL/L (ref 98–107)
CHOLEST SERPL-MCNC: 197 MG/DL
CREAT SERPL-MCNC: 0.72 MG/DL (ref 0.51–1.17)
DEPRECATED HCO3 PLAS-SCNC: 24 MMOL/L (ref 22–29)
EGFRCR SERPLBLD CKD-EPI 2021: >90 ML/MIN/1.73M2
FASTING STATUS PATIENT QL REPORTED: YES
FASTING STATUS PATIENT QL REPORTED: YES
GLUCOSE SERPL-MCNC: 108 MG/DL (ref 70–99)
HBA1C MFR BLD: 5.3 % (ref 0–5.6)
HCV AB SERPL QL IA: NONREACTIVE
HDLC SERPL-MCNC: 51 MG/DL
LDLC SERPL CALC-MCNC: 131 MG/DL
NONHDLC SERPL-MCNC: 146 MG/DL
POTASSIUM SERPL-SCNC: 4.6 MMOL/L (ref 3.4–5.3)
PROT SERPL-MCNC: 7.7 G/DL (ref 6.4–8.3)
SODIUM SERPL-SCNC: 136 MMOL/L (ref 135–145)
TRIGL SERPL-MCNC: 73 MG/DL

## 2024-06-19 PROCEDURE — 86803 HEPATITIS C AB TEST: CPT

## 2024-06-19 PROCEDURE — 80053 COMPREHEN METABOLIC PANEL: CPT

## 2024-06-19 PROCEDURE — 83036 HEMOGLOBIN GLYCOSYLATED A1C: CPT

## 2024-06-19 PROCEDURE — 80061 LIPID PANEL: CPT

## 2024-06-19 PROCEDURE — 36415 COLL VENOUS BLD VENIPUNCTURE: CPT

## 2024-06-21 NOTE — RESULT ENCOUNTER NOTE
Ced Cristina,     It was nice meeting you the other day.      Your lab results came back normal.     Your LDL or bad cholesterol is slightly elevated. Work on incorporating healthy fats and cholesterol and increasing exercise can help improve those levels.      Feel free to contact me via [employer] or call the clinic at [phone].     Sincerely,  Anjali Roldan, KWABENA, FNP-C

## 2024-06-24 ENCOUNTER — MYC MEDICAL ADVICE (OUTPATIENT)
Dept: SLEEP MEDICINE | Facility: CLINIC | Age: 34
End: 2024-06-24
Payer: COMMERCIAL

## 2024-06-24 DIAGNOSIS — G47.00 INSOMNIA, UNSPECIFIED TYPE: ICD-10-CM

## 2024-06-24 DIAGNOSIS — G47.33 OSA (OBSTRUCTIVE SLEEP APNEA): Primary | ICD-10-CM

## 2024-07-17 ENCOUNTER — MYC REFILL (OUTPATIENT)
Dept: FAMILY MEDICINE | Facility: CLINIC | Age: 34
End: 2024-07-17
Payer: COMMERCIAL

## 2024-07-17 DIAGNOSIS — F33.41 MAJOR DEPRESSIVE DISORDER, RECURRENT EPISODE, IN PARTIAL REMISSION (H): ICD-10-CM

## 2024-07-17 RX ORDER — ESCITALOPRAM OXALATE 20 MG/1
20 TABLET ORAL DAILY
Qty: 90 TABLET | Refills: 3 | Status: SHIPPED | OUTPATIENT
Start: 2024-07-17

## 2024-08-02 ENCOUNTER — DOCUMENTATION ONLY (OUTPATIENT)
Dept: SLEEP MEDICINE | Facility: CLINIC | Age: 34
End: 2024-08-02
Payer: COMMERCIAL

## 2024-08-02 DIAGNOSIS — G47.33 OBSTRUCTIVE SLEEP APNEA: Primary | ICD-10-CM

## 2024-08-02 DIAGNOSIS — G47.01 INSOMNIA DUE TO MEDICAL CONDITION: ICD-10-CM

## 2024-08-02 NOTE — PROGRESS NOTES
Patient was offered choice of vendor and chose [facility].  Patient Timothy Thapa was set up at [place] on August 2, 2024. Patient received a Resmed Airsense 10 Pressures were set at  5-15 cm H2O.   Patient s ramp is 5 cm H2O for Auto and FLEX/EPR is EPR, 2.  Patient received a Resmed Mask name: Airfit F20 and N20  Nasal mask size Medium, heated tubing and heated humidifier.  Patient has the following compliance requirements:none.Patient has a follow up on TBD.  Sam O Humes

## 2024-08-05 ENCOUNTER — DOCUMENTATION ONLY (OUTPATIENT)
Dept: SLEEP MEDICINE | Facility: CLINIC | Age: 34
End: 2024-08-05
Payer: COMMERCIAL

## 2024-08-05 NOTE — PROGRESS NOTES
3 day Sleep therapy management telephone visit    Diagnostic AHI:    HST: 10.6        LEFT VOICE MESSAGE FOR PATIENT TO RETURN CALL      Order settings:  CPAP MIN CPAP MAX   5 cm H2O 15 cm H2O         Device settings:  CPAP MIN CPAP MAX EPR RESMED SOFT RESPONSE SETTING   5.0 cm  H20 15.0 cm  H20 TWO OFF         Patient has the following upcoming sleep appts:      Replacement device: No  STM ordered by provider: Yes     Total time spent on accessing and  interpreting remote patient PAP therapy data  10 minutes    Total time spent counseling, coaching  and reviewing PAP therapy data with patient  0 minutes

## 2024-08-27 ENCOUNTER — DOCUMENTATION ONLY (OUTPATIENT)
Dept: SLEEP MEDICINE | Facility: CLINIC | Age: 34
End: 2024-08-27
Payer: COMMERCIAL

## 2024-08-27 NOTE — PROGRESS NOTES
14  DAY STM VISIT    Diagnostic AHI:  HST: 10.6        Message left for patient to return call.    Assessment: Pt meeting objective benchmarks.      Action plan: waiting for patient to return call.  and pt to have 30 day STM visit.      Device type: Auto-CPAP    PAP settings: CPAP min 5.0 cm  H20       CPAP max 15.0 cm  H20      CPAP fixed  cm  H20      95th% pressure 11.5 cm  H20        RESMED EPR level Setting: TWO    RESMED Soft response setting:  OFF    Mask type:      Objective measures: 14 day rolling measures      Compliance  100 %      Leak  12.64  lpm  last  upload      AHI 2.75   last  upload      Average number of minutes 530      Objective measure goal  Compliance   Goal >70%  Leak   Goal < 24 lpm  AHI  Goal < 5  Usage  Goal >240    Patient has the following upcoming sleep appts:      Total time spent on accessing and interpreting remote patient PAP therapy data  10 minutes    Total time spent counseling, coaching  and reviewing PAP therapy data with patient  1 minutes    [id number]  [id number]  no (3 day STM)

## 2025-03-06 ENCOUNTER — MYC MEDICAL ADVICE (OUTPATIENT)
Dept: FAMILY MEDICINE | Facility: CLINIC | Age: 35
End: 2025-03-06
Payer: COMMERCIAL

## 2025-03-27 ENCOUNTER — VIRTUAL VISIT (OUTPATIENT)
Dept: URGENT CARE | Facility: CLINIC | Age: 35
End: 2025-03-27
Payer: COMMERCIAL

## 2025-03-27 ENCOUNTER — LAB (OUTPATIENT)
Dept: LAB | Facility: CLINIC | Age: 35
End: 2025-03-27
Attending: PHYSICIAN ASSISTANT
Payer: COMMERCIAL

## 2025-03-27 DIAGNOSIS — J02.9 ACUTE PHARYNGITIS, UNSPECIFIED ETIOLOGY: ICD-10-CM

## 2025-03-27 DIAGNOSIS — J02.9 ACUTE PHARYNGITIS, UNSPECIFIED ETIOLOGY: Primary | ICD-10-CM

## 2025-03-27 LAB
DEPRECATED S PYO AG THROAT QL EIA: NEGATIVE
S PYO DNA THROAT QL NAA+PROBE: NOT DETECTED

## 2025-03-27 NOTE — PROGRESS NOTES
"Assessment & Plan     Acute pharyngitis, unspecified etiology  - Streptococcus A Rapid Screen w/Reflex to PCR - Clinic Collect; Future    Rapid strep negative.  Keflex twice daily for 10 days if strep PCR positive.  Drink plenty of fluids and rest.  May use salt water gargles- about 8 oz warm water with about 1 teaspoon salt  Over the counter pain relievers such as Tylenol or ibuprofen may be used as needed.   Honey lemon tea helps to soothe the throat. \"Throat Coat\" tea is soothing as well.  Please follow up with primary care provider if not improving, worsening or new symptoms.    Return in about 1 week (around 4/3/2025) for visit with primary care provider if not improving.       [facility]    Subjective   Timothy Thapa is a 34 year old who presents for the following health issues    HPI    Symptom onset: yesterday afternoon  Current symptoms: tickle in throat, pain during the night last night  Right ear pain  Post nasal drip, no significant nasal congestion  Minimal intermittent cough coming from a tickle in throat  No fever, no enlarged adenopathy    Medication review complete.    Review of Systems   ROS negative except as stated above.      Objective    Physical Exam   GENERAL: Healthy, alert and no distress  EYES: Eyes grossly normal to inspection.  No discharge or erythema, or obvious scleral/conjunctival abnormalities.  HENT: Normal cephalic/atraumatic.  External ears, nose and mouth without ulcers or lesions.  No nasal drainage visible.  RESP: No audible cough wheeze or visible cyanosis.  No visible retractions or increased work of breathing.    SKIN: Visible skin clear. No significant rash, abnormal pigmentation or lesions.  NEURO: Cranial nerves grossly intact.  Mentation and speech appropriate for age.  PSYCH: Mentation appears normal, affect normal/bright, judgement and insight intact, normal speech and appearance well-groomed.    Type of service:  " Video Visit  Video Start Time: 9:02AM  Video End Time: 9:13AM  Originating Location (pt. Location): Home  Distant Location (provider location):  [facility]- offsite at home, [facility]  Platform used for Video Visit: Drill Cycle    Results for orders placed or performed in visit on 03/27/25   Streptococcus A Rapid Screen w/Reflex to PCR - Clinic Collect     Status: Normal    Specimen: Throat; Swab   Result Value Ref Range    Group A Strep antigen Negative Negative

## 2025-03-27 NOTE — LETTER
[facility]  [address] [address]  Phone: [phone]    March 27, 2025        Timothy Thapa  [address] [address]          To whom it may concern:    RE: Timothy Thapa    Patient was evaluated today. Please excuse [place]ian[place]on from work missed.    Please contact me for questions or concerns.      Sincerely,    Brandie Cespedes PA-C  [facility]

## 2025-04-14 ENCOUNTER — VIRTUAL VISIT (OUTPATIENT)
Dept: FAMILY MEDICINE | Facility: CLINIC | Age: 35
End: 2025-04-14
Payer: COMMERCIAL

## 2025-04-14 DIAGNOSIS — F41.1 GAD (GENERALIZED ANXIETY DISORDER): Primary | ICD-10-CM

## 2025-04-14 PROCEDURE — 98006 SYNCH AUDIO-VIDEO EST MOD 30: CPT

## 2025-04-14 RX ORDER — BUSPIRONE HYDROCHLORIDE 5 MG/1
5 TABLET ORAL 2 TIMES DAILY
Qty: 60 TABLET | Refills: 1 | Status: SHIPPED | OUTPATIENT
Start: 2025-04-14

## 2025-04-14 ASSESSMENT — ANXIETY QUESTIONNAIRES
7. FEELING AFRAID AS IF SOMETHING AWFUL MIGHT HAPPEN: MORE THAN HALF THE DAYS
2. NOT BEING ABLE TO STOP OR CONTROL WORRYING: MORE THAN HALF THE DAYS
7. FEELING AFRAID AS IF SOMETHING AWFUL MIGHT HAPPEN: MORE THAN HALF THE DAYS
6. BECOMING EASILY ANNOYED OR IRRITABLE: SEVERAL DAYS
GAD7 TOTAL SCORE: 10
1. FEELING NERVOUS, ANXIOUS, OR ON EDGE: MORE THAN HALF THE DAYS
IF YOU CHECKED OFF ANY PROBLEMS ON THIS QUESTIONNAIRE, HOW DIFFICULT HAVE THESE PROBLEMS MADE IT FOR YOU TO DO YOUR WORK, TAKE CARE OF THINGS AT HOME, OR GET ALONG WITH OTHER PEOPLE: VERY DIFFICULT
GAD7 TOTAL SCORE: 10
8. IF YOU CHECKED OFF ANY PROBLEMS, HOW DIFFICULT HAVE THESE MADE IT FOR YOU TO DO YOUR WORK, TAKE CARE OF THINGS AT HOME, OR GET ALONG WITH OTHER PEOPLE?: VERY DIFFICULT
3. WORRYING TOO MUCH ABOUT DIFFERENT THINGS: MORE THAN HALF THE DAYS
5. BEING SO RESTLESS THAT IT IS HARD TO SIT STILL: NOT AT ALL
GAD7 TOTAL SCORE: 10
4. TROUBLE RELAXING: SEVERAL DAYS

## 2025-04-14 ASSESSMENT — PATIENT HEALTH QUESTIONNAIRE - PHQ9
SUM OF ALL RESPONSES TO PHQ QUESTIONS 1-9: 3
SUM OF ALL RESPONSES TO PHQ QUESTIONS 1-9: 3
10. IF YOU CHECKED OFF ANY PROBLEMS, HOW DIFFICULT HAVE THESE PROBLEMS MADE IT FOR YOU TO DO YOUR WORK, TAKE CARE OF THINGS AT HOME, OR GET ALONG WITH OTHER PEOPLE: SOMEWHAT DIFFICULT

## 2025-04-14 ASSESSMENT — ENCOUNTER SYMPTOMS: NERVOUS/ANXIOUS: 1

## 2025-04-14 NOTE — PROGRESS NOTES
"Ibeth is a 34 year old who is being evaluated via a billable video visit.    How would you like to obtain your AVS? MyChart  If the video visit is dropped, the invitation should be resent by: Text to cell phone: [phone]  Will anyone else be joining your video visit? No      Assessment & Plan     NEETU (generalized anxiety disorder)  Self weaned off of Lexapro but continues to have a dull hump of anxiety. Discussed various options and patient plans to try Buspar. If no improvement, will go back to lexapro at a low dose. Plan for patient to follow up in one month.   - busPIRone (BUSPAR) 5 MG tablet; Take 1 tablet (5 mg) by mouth 2 times daily.      BMI  Estimated body mass index is 29.95 kg/m  as calculated from the following:    Height as of 6/3/24: 1.816 m (5' 11.5\").    Weight as of 6/3/24: 98.8 kg (217 lb 12.8 oz).           Subjective   Ibeth is a 34 year old, presenting for the following health issues:  Recheck Medication and Anxiety (Medication with little side effects)      4/14/2025     5:28 PM   Additional Questions   Roomed by sunni merchant     Anxiety    History of Present Illness       Mental Health Follow-up:  Patient presents to follow-up on Anxiety.    Patient's anxiety since last visit has been:  Better  The patient is having other symptoms associated with anxiety.  Any significant life events: No  Patient is feeling anxious or having panic attacks.  Patient has no concerns about alcohol or drug use.    Ibeth eats 0-1 servings of fruits and vegetables daily.Ibeth consumes 1 sweetened beverage(s) daily.Ibeth exercises with enough effort to increase Ibeth's heart rate 9 or less minutes per day.  Ibeth exercises with enough effort to increase Ibeth's heart rate 3 or less days per week.   Ibeth is taking medications regularly.      Pt reports he is not taking any medications right now and would like a medication for anxiety that does not have much for side effects.          7/13/2023     7:35 PM " 6/3/2024     4:00 PM 4/14/2025     8:58 AM   PHQ   PHQ-9 Total Score 6 9 3    Q9: Thoughts of better off dead/self-harm past 2 weeks Not at all Not at all Not at all       Patient-reported         1/12/2023    12:12 PM 7/14/2023     4:00 PM 4/14/2025     9:00 AM   NEETU-7 SCORE   Total Score 11 (moderate anxiety)  10 (moderate anxiety)   Total Score 11 7 10        Patient-reported       Was on lexapro  At one point was on a large amount of lexapro and wellbutrin  Finds that hearing aids and CPAP has improved mental health  Stopped Lexapro completely in December  At work notices a low hum of anxiety   Was previously on zoloft but towards the end caused obsessive thoughts   Did not notice any major side effects to lexapro  Feels like depression symptoms are well managed         Objective           Vitals:  No vitals were obtained today due to virtual visit.    Physical Exam   GENERAL: alert and no distress  EYES: Eyes grossly normal to inspection.  No discharge or erythema, or obvious scleral/conjunctival abnormalities.  RESP: No audible wheeze, cough, or visible cyanosis.    SKIN: Visible skin clear. No significant rash, abnormal pigmentation or lesions.  NEURO: Cranial nerves grossly intact.  Mentation and speech appropriate for age.  PSYCH: Appropriate affect, tone, and pace of words          Video-Visit Details    Type of service:  Video Visit   Originating Location (pt. Location): Home    Distant Location (provider location):  On-site  Platform used for Video Visit: Elizabeth  Signed Electronically by: SABRINA Haley CNP

## 2025-05-05 ENCOUNTER — PATIENT OUTREACH (OUTPATIENT)
Dept: CARE COORDINATION | Facility: CLINIC | Age: 35
End: 2025-05-05
Payer: COMMERCIAL

## 2025-05-19 ENCOUNTER — PATIENT OUTREACH (OUTPATIENT)
Dept: CARE COORDINATION | Facility: CLINIC | Age: 35
End: 2025-05-19
Payer: COMMERCIAL

## 2025-06-04 ENCOUNTER — MYC MEDICAL ADVICE (OUTPATIENT)
Dept: FAMILY MEDICINE | Facility: CLINIC | Age: 35
End: 2025-06-04
Payer: COMMERCIAL

## 2025-06-04 DIAGNOSIS — R19.7 FREQUENT DIARRHEA: Primary | ICD-10-CM

## 2025-06-09 ENCOUNTER — TELEPHONE (OUTPATIENT)
Dept: GASTROENTEROLOGY | Facility: CLINIC | Age: 35
End: 2025-06-09
Payer: COMMERCIAL

## 2025-06-09 NOTE — TELEPHONE ENCOUNTER
"Endoscopy Scheduling Screen    Caller: patient    Have you had any respiratory illness or flu-like symptoms in the last 10 days?  No    What is your communication preference for Instructions and/or Bowel Prep?   MyChart    What insurance is in the chart?  Other:  r    Ordering/Referring Provider: Anjali Roldan APRN CNP     (If ordering provider performs procedure, schedule with ordering provider unless otherwise instructed. )    BMI: Estimated body mass index is 29.95 kg/m  as calculated from the following:    Height as of 6/3/24: 1.816 m (5' 11.5\").    Weight as of 6/3/24: 98.8 kg (217 lb 12.8 oz).     Sedation Ordered  moderate sedation.   If patient BMI > 50 do not schedule in ASC.    If [facility] > 45 [facility]    Are you taking methadone or Suboxone?  NO, No RN review required.    Have you been diagnosed and are being treated for severe PTSD or severe anxiety?  NO, No RN review required.    Are you taking any prescription medications for pain 3 or more times per week?   NO, No RN review required.    Do you have a history of malignant hyperthermia?  No    (Females) Are you currently pregnant?   No     Have you been diagnosed or told you have pulmonary hypertension?   No    Do you have an LVAD?  No    Have you been told you have moderate to severe sleep apnea?  Yes. Do you use a CPAP? Yes Where is the patient located?. (RN Review required for scheduling unless scheduling in Hospital.)     Have you been told you have COPD, asthma, or any other lung disease?  No    Has your doctor ordered any cardiac tests like echo, angiogram, stress test, ablation, or EKG, that you have not completed yet?  No    Do you  have a history of any heart conditions?  No     Have you ever had or are you waiting for an organ transplant?  No. Continue scheduling, no site restrictions.    Have you had a stroke or transient ischemic attack (TIA aka \"mini stroke\") in the last 2 years?   No.    Have you been diagnosed " "with or been told you have cirrhosis of the liver?   No.    Are you currently on dialysis?   No    Do you need assistance transferring?   No    BMI: Estimated body mass index is 29.95 kg/m  as calculated from the following:    Height as of 6/3/24: 1.816 m (5' 11.5\").    Weight as of 6/3/24: 98.8 kg (217 lb 12.8 oz).     Is patients BMI > 40 and scheduling location [facility]?  No    Do you take an injectable or oral medication for weight loss or diabetes (excluding insulin)?  No    Do you take the medication Naltrexone?  No    Do you take blood thinners?  No       Prep   Are you currently on dialysis or do you have chronic kidney disease?  No    Do you have a diagnosis of diabetes?  No    Do you have a diagnosis of cystic fibrosis (CF)?  No    On a regular basis do you go 3 -5 days between bowel movements?  No    BMI > 40?  No    Preferred Pharmacy:    [facility] [place]  [place] - [address]  [address]  [address]  [place] [facility] [place] [address]  Phone: [phone] Fax: [phone]      Final Scheduling Details     Procedure scheduled  Colonoscopy    Surgeon:  mary     Date of procedure:  7/18/25     Pre-OP / PAC:   No - Not required for this site.    Location  [facility] - Patient preference.    Sedation   Moderate Sedation - Per order.      Patient Reminders:   You will receive a call from a Nurse to review instructions and health history.  This assessment must be completed prior to your procedure.  Failure to complete the Nurse assessment may result in the procedure being cancelled.      On the day of your procedure, please designate an adult(s) who can drive you home stay with you for the next 24 hours. The medicines used in the exam will make you sleepy. You will not be able to drive.      You cannot take public transportation, ride share services, or non-medical taxi service without a responsible caregiver.  Medical transport services are allowed with the requirement that a responsible caregiver " will receive you at your destination.  We require that drivers and caregivers are confirmed prior to your procedure.

## 2025-06-25 ENCOUNTER — TELEPHONE (OUTPATIENT)
Dept: GASTROENTEROLOGY | Facility: CLINIC | Age: 35
End: 2025-06-25

## 2025-07-01 ENCOUNTER — TELEPHONE (OUTPATIENT)
Dept: GASTROENTEROLOGY | Facility: CLINIC | Age: 35
End: 2025-07-01
Payer: COMMERCIAL

## 2025-07-01 NOTE — TELEPHONE ENCOUNTER
Caller: Ibeth    Reason for Reschedule/Cancellation (please be detailed, any staff messages or encounters to note?):   Work conflict    Did you cancel or rescheduled an EUS procedure? No.    Is screening questionnaire older than 3 months from the reschedule date.   If Yes, please complete screening questionnaire. No    Prior to reschedule please review:  Ordering Provider: Anjali Roldan   Sedation Determined: CS  Does patient have any ASC Exclusions, please identify?: no    Notes on Cancelled Procedure:  Procedure: Lower Endoscopy [Colonoscopy]   Date: 7/18  Location: [facility]; [address] [place] [address]  Surgeon: Norma    Rescheduled: Yes,   Procedure: Lower Endoscopy [Colonoscopy]    Date: 7/25   Location: [facility]; [address] [place] [address]   Surgeon: Harjeet   Sedation Level Scheduled  CS ,  Reason for Sedation Level order   Instructions updated and sent: yes     Does patient need PAC or Pre -Op Rescheduled? : no

## 2025-07-03 DIAGNOSIS — F41.1 GAD (GENERALIZED ANXIETY DISORDER): ICD-10-CM

## 2025-07-03 RX ORDER — BUSPIRONE HYDROCHLORIDE 5 MG/1
5 TABLET ORAL 2 TIMES DAILY
Qty: 60 TABLET | Refills: 1 | Status: SHIPPED | OUTPATIENT
Start: 2025-07-03

## 2025-07-19 ENCOUNTER — HEALTH MAINTENANCE LETTER (OUTPATIENT)
Age: 35
End: 2025-07-19

## 2025-07-28 ENCOUNTER — TELEPHONE (OUTPATIENT)
Dept: GASTROENTEROLOGY | Facility: CLINIC | Age: 35
End: 2025-07-28
Payer: COMMERCIAL

## 2025-07-28 NOTE — TELEPHONE ENCOUNTER
Rescheduled Colonoscopy  Due to: scheduling conflict (rescheduled x2)      Pre visit planning completed.      Procedure details:    Patient scheduled for Colonoscopy on 8/15/25.     Arrival time: 0800. Procedure time 0845    Facility location: [facility]; [address] [place] [address]. Check in location: Main entrance, door #1 on the North side of the building under roundabout awning. DO NOT GO TO SURGERY/ED ENTRANCE.     Sedation type: Conscious sedation     Pre op exam needed? No.    Indication for procedure: frequent diarrhea      Chart review:     Electronic implanted devices? No    Recent diagnosis of diverticulitis within the last 6 weeks? No      Medication review:    Diabetic? No    Anticoagulants? No    Weight loss medication/injectable? No GLP-1 medication per patient's medication list. Nursing to verify with pre-assessment call.    Other medication HOLDING recommendations:  N/A      Prep for procedure:     Bowel prep recommendation: Standard Miralax.   Due to: standard bowel prep    Procedure information and instructions sent via [employer]         Kadie Nolan RN  Endoscopy Procedure Pre Assessment   [phone] option 3

## 2025-07-30 NOTE — TELEPHONE ENCOUNTER
Attempted to contact patient in order to complete pre assessment questions.     No answer. Left message to return call to [phone] option 3.    Callback communication sent via [email or web address].    Jessica Hodgson LPN

## 2025-08-19 SDOH — HEALTH STABILITY: PHYSICAL HEALTH: ON AVERAGE, HOW MANY MINUTES DO YOU ENGAGE IN EXERCISE AT THIS LEVEL?: 10 MIN

## 2025-08-19 SDOH — HEALTH STABILITY: PHYSICAL HEALTH: ON AVERAGE, HOW MANY DAYS PER WEEK DO YOU ENGAGE IN MODERATE TO STRENUOUS EXERCISE (LIKE A BRISK WALK)?: 1 DAY

## 2025-08-19 ASSESSMENT — SOCIAL DETERMINANTS OF HEALTH (SDOH): HOW OFTEN DO YOU GET TOGETHER WITH FRIENDS OR RELATIVES?: ONCE A WEEK

## 2025-08-20 ENCOUNTER — OFFICE VISIT (OUTPATIENT)
Dept: FAMILY MEDICINE | Facility: CLINIC | Age: 35
End: 2025-08-20
Payer: COMMERCIAL

## 2025-08-20 VITALS
HEIGHT: 72 IN | HEART RATE: 70 BPM | SYSTOLIC BLOOD PRESSURE: 112 MMHG | OXYGEN SATURATION: 97 % | DIASTOLIC BLOOD PRESSURE: 76 MMHG | WEIGHT: 212.2 LBS | BODY MASS INDEX: 28.74 KG/M2 | TEMPERATURE: 98.9 F

## 2025-08-20 DIAGNOSIS — F41.1 GAD (GENERALIZED ANXIETY DISORDER): ICD-10-CM

## 2025-08-20 DIAGNOSIS — E66.3 OVER WEIGHT: ICD-10-CM

## 2025-08-20 DIAGNOSIS — Z00.00 ROUTINE GENERAL MEDICAL EXAMINATION AT A HEALTH CARE FACILITY: Primary | ICD-10-CM

## 2025-08-20 RX ORDER — BUSPIRONE HYDROCHLORIDE 5 MG/1
5 TABLET ORAL 2 TIMES DAILY
Qty: 180 TABLET | Refills: 3 | Status: SHIPPED | OUTPATIENT
Start: 2025-08-20

## 2025-08-20 ASSESSMENT — ANXIETY QUESTIONNAIRES
3. WORRYING TOO MUCH ABOUT DIFFERENT THINGS: SEVERAL DAYS
GAD7 TOTAL SCORE: 4
1. FEELING NERVOUS, ANXIOUS, OR ON EDGE: SEVERAL DAYS
5. BEING SO RESTLESS THAT IT IS HARD TO SIT STILL: NOT AT ALL
7. FEELING AFRAID AS IF SOMETHING AWFUL MIGHT HAPPEN: SEVERAL DAYS
IF YOU CHECKED OFF ANY PROBLEMS ON THIS QUESTIONNAIRE, HOW DIFFICULT HAVE THESE PROBLEMS MADE IT FOR YOU TO DO YOUR WORK, TAKE CARE OF THINGS AT HOME, OR GET ALONG WITH OTHER PEOPLE: SOMEWHAT DIFFICULT
6. BECOMING EASILY ANNOYED OR IRRITABLE: NOT AT ALL
GAD7 TOTAL SCORE: 4
2. NOT BEING ABLE TO STOP OR CONTROL WORRYING: SEVERAL DAYS

## 2025-08-20 ASSESSMENT — PATIENT HEALTH QUESTIONNAIRE - PHQ9
SUM OF ALL RESPONSES TO PHQ QUESTIONS 1-9: 3
5. POOR APPETITE OR OVEREATING: NOT AT ALL